# Patient Record
Sex: MALE | Race: WHITE | NOT HISPANIC OR LATINO | Employment: OTHER | URBAN - METROPOLITAN AREA
[De-identification: names, ages, dates, MRNs, and addresses within clinical notes are randomized per-mention and may not be internally consistent; named-entity substitution may affect disease eponyms.]

---

## 2023-03-23 LAB
ANION GAP IN SER/PLAS: 15 MMOL/L (ref 10–20)
CALCIUM (MG/DL) IN SER/PLAS: 9.7 MG/DL (ref 8.6–10.3)
CARBON DIOXIDE, TOTAL (MMOL/L) IN SER/PLAS: 22 MMOL/L (ref 21–32)
CHLORIDE (MMOL/L) IN SER/PLAS: 106 MMOL/L (ref 98–107)
CREATININE (MG/DL) IN SER/PLAS: 2.02 MG/DL (ref 0.5–1.3)
GFR MALE: 32 ML/MIN/1.73M2
GLUCOSE (MG/DL) IN SER/PLAS: 91 MG/DL (ref 74–99)
POTASSIUM (MMOL/L) IN SER/PLAS: 4.6 MMOL/L (ref 3.5–5.3)
SODIUM (MMOL/L) IN SER/PLAS: 138 MMOL/L (ref 136–145)
UREA NITROGEN (MG/DL) IN SER/PLAS: 48 MG/DL (ref 6–23)

## 2023-03-24 DIAGNOSIS — M10.9 GOUT, UNSPECIFIED CAUSE, UNSPECIFIED CHRONICITY, UNSPECIFIED SITE: Primary | ICD-10-CM

## 2023-03-24 RX ORDER — DIAZEPAM 5 MG/1
TABLET ORAL
COMMUNITY
Start: 2022-04-08 | End: 2023-03-31 | Stop reason: ALTCHOICE

## 2023-03-24 RX ORDER — HYDRALAZINE HYDROCHLORIDE 50 MG/1
TABLET, FILM COATED ORAL
COMMUNITY
Start: 2023-03-23 | End: 2023-03-31

## 2023-03-24 RX ORDER — CALCIUM CARBONATE 300MG(750)
1 TABLET,CHEWABLE ORAL DAILY
COMMUNITY
Start: 2023-03-23

## 2023-03-24 RX ORDER — CLINDAMYCIN HYDROCHLORIDE 150 MG/1
CAPSULE ORAL
COMMUNITY
Start: 2022-12-29 | End: 2023-03-30 | Stop reason: ALTCHOICE

## 2023-03-24 RX ORDER — DOXAZOSIN 4 MG/1
1 TABLET ORAL DAILY
COMMUNITY
Start: 2023-01-25 | End: 2023-03-31

## 2023-03-24 RX ORDER — NAPROXEN SODIUM 220 MG/1
1 TABLET, FILM COATED ORAL DAILY
COMMUNITY
Start: 2020-11-18

## 2023-03-24 RX ORDER — AMLODIPINE BESYLATE 5 MG/1
1 TABLET ORAL DAILY
COMMUNITY
Start: 2022-07-01 | End: 2023-05-15

## 2023-03-24 RX ORDER — CHLORTHALIDONE 25 MG/1
1 TABLET ORAL DAILY
COMMUNITY
Start: 2018-07-09 | End: 2023-03-31

## 2023-03-24 RX ORDER — ALLOPURINOL 100 MG/1
TABLET ORAL
COMMUNITY
Start: 2023-03-16 | End: 2023-05-15 | Stop reason: SDUPTHER

## 2023-03-24 RX ORDER — NIFEDIPINE 90 MG/1
1 TABLET, EXTENDED RELEASE ORAL DAILY
COMMUNITY
Start: 2020-12-03 | End: 2023-03-30 | Stop reason: SDUPTHER

## 2023-03-24 RX ORDER — LOSARTAN POTASSIUM 100 MG/1
1 TABLET ORAL DAILY
COMMUNITY
Start: 2018-07-09 | End: 2024-03-21

## 2023-03-24 RX ORDER — COLCHICINE 0.6 MG/1
TABLET ORAL
COMMUNITY
Start: 2016-07-14 | End: 2023-03-24 | Stop reason: SDUPTHER

## 2023-03-24 RX ORDER — COLCHICINE 0.6 MG/1
0.6 TABLET ORAL DAILY
Qty: 30 TABLET | Refills: 3 | Status: SHIPPED | OUTPATIENT
Start: 2023-03-24 | End: 2024-01-31 | Stop reason: WASHOUT

## 2023-03-24 RX ORDER — ETODOLAC 200 MG/1
1 CAPSULE ORAL DAILY
COMMUNITY
Start: 2022-04-26 | End: 2023-03-31

## 2023-03-24 RX ORDER — EZETIMIBE 10 MG/1
1 TABLET ORAL NIGHTLY
COMMUNITY
Start: 2019-09-12 | End: 2023-03-31

## 2023-03-24 RX ORDER — ALLOPURINOL 200 MG/1
1 TABLET ORAL DAILY
COMMUNITY
Start: 2023-01-25 | End: 2023-03-31

## 2023-03-24 RX ORDER — GABAPENTIN 400 MG/1
1 CAPSULE ORAL 2 TIMES DAILY
COMMUNITY
Start: 2022-04-07 | End: 2023-03-31 | Stop reason: ALTCHOICE

## 2023-03-24 RX ORDER — CYCLOBENZAPRINE HCL 5 MG
1 TABLET ORAL 2 TIMES DAILY
COMMUNITY
Start: 2022-03-30 | End: 2023-03-31

## 2023-03-28 DIAGNOSIS — R79.9 ABNORMAL BLOOD CHEMISTRY: ICD-10-CM

## 2023-03-28 DIAGNOSIS — I10 HYPERTENSION, UNSPECIFIED TYPE: ICD-10-CM

## 2023-03-28 DIAGNOSIS — E78.5 HYPERLIPIDEMIA, UNSPECIFIED HYPERLIPIDEMIA TYPE: ICD-10-CM

## 2023-03-28 DIAGNOSIS — M10.9 GOUT, UNSPECIFIED CAUSE, UNSPECIFIED CHRONICITY, UNSPECIFIED SITE: ICD-10-CM

## 2023-03-28 DIAGNOSIS — I25.10 CVD (CARDIOVASCULAR DISEASE): ICD-10-CM

## 2023-03-28 DIAGNOSIS — E03.9 HYPOTHYROIDISM, UNSPECIFIED TYPE: ICD-10-CM

## 2023-03-28 DIAGNOSIS — E55.9 VITAMIN D DEFICIENCY: ICD-10-CM

## 2023-03-29 ENCOUNTER — LAB (OUTPATIENT)
Dept: LAB | Facility: LAB | Age: 83
End: 2023-03-29
Payer: MEDICARE

## 2023-03-29 DIAGNOSIS — R79.9 ABNORMAL BLOOD CHEMISTRY: ICD-10-CM

## 2023-03-29 DIAGNOSIS — M10.9 GOUT, UNSPECIFIED CAUSE, UNSPECIFIED CHRONICITY, UNSPECIFIED SITE: ICD-10-CM

## 2023-03-29 DIAGNOSIS — E03.9 HYPOTHYROIDISM, UNSPECIFIED TYPE: ICD-10-CM

## 2023-03-29 DIAGNOSIS — E78.5 HYPERLIPIDEMIA, UNSPECIFIED HYPERLIPIDEMIA TYPE: ICD-10-CM

## 2023-03-29 DIAGNOSIS — E55.9 VITAMIN D DEFICIENCY: ICD-10-CM

## 2023-03-29 PROBLEM — I73.9 PAD (PERIPHERAL ARTERY DISEASE) (CMS-HCC): Status: ACTIVE | Noted: 2023-03-29

## 2023-03-29 PROBLEM — I25.10 CAD (CORONARY ARTERY DISEASE): Status: ACTIVE | Noted: 2023-03-29

## 2023-03-29 PROBLEM — M54.50 LOW BACK PAIN: Status: ACTIVE | Noted: 2023-03-29

## 2023-03-29 PROBLEM — R43.2 LOSS OF TASTE: Status: ACTIVE | Noted: 2023-03-29

## 2023-03-29 PROBLEM — R22.42 FOOT MASS, LEFT: Status: ACTIVE | Noted: 2023-03-29

## 2023-03-29 PROBLEM — R79.89 LOW TSH LEVEL: Status: ACTIVE | Noted: 2023-03-29

## 2023-03-29 PROBLEM — M54.30 SCIATICA: Status: ACTIVE | Noted: 2023-03-29

## 2023-03-29 PROBLEM — M47.9 OSTEOARTHRITIS OF SPINE: Status: ACTIVE | Noted: 2023-03-29

## 2023-03-29 PROBLEM — M17.12 PRIMARY OSTEOARTHRITIS OF LEFT KNEE: Status: ACTIVE | Noted: 2023-03-29

## 2023-03-29 PROBLEM — I44.1 MOBITZ (TYPE) I (WENCKEBACH'S) ATRIOVENTRICULAR BLOCK: Status: ACTIVE | Noted: 2023-03-29

## 2023-03-29 PROBLEM — R29.898 XIPHOID PROMINENCE: Status: ACTIVE | Noted: 2023-03-29

## 2023-03-29 PROBLEM — D33.4 SCHWANNOMA OF SPINAL CORD (MULTI): Status: ACTIVE | Noted: 2023-03-29

## 2023-03-29 PROBLEM — E78.00 ELEVATED LOW DENSITY LIPOPROTEIN (LDL) CHOLESTEROL LEVEL: Status: ACTIVE | Noted: 2023-03-29

## 2023-03-29 PROBLEM — R00.1 BRADYCARDIA: Status: ACTIVE | Noted: 2023-03-29

## 2023-03-29 PROBLEM — U07.1 COVID-19: Status: ACTIVE | Noted: 2023-03-29

## 2023-03-29 PROBLEM — M79.672 LEFT FOOT PAIN: Status: ACTIVE | Noted: 2023-03-29

## 2023-03-29 PROBLEM — Z95.1 S/P CABG X 5: Status: ACTIVE | Noted: 2023-03-29

## 2023-03-29 PROBLEM — M79.89 SWELLING OF RIGHT LOWER EXTREMITY: Status: ACTIVE | Noted: 2023-03-29

## 2023-03-29 PROBLEM — I65.23 ATHEROSCLEROSIS OF BOTH CAROTID ARTERIES: Status: ACTIVE | Noted: 2023-03-29

## 2023-03-29 PROBLEM — R52 BODY ACHES: Status: ACTIVE | Noted: 2023-03-29

## 2023-03-29 PROBLEM — R22.42 KNEE MASS, LEFT: Status: ACTIVE | Noted: 2023-03-29

## 2023-03-29 PROBLEM — I10 HYPERTENSION: Status: ACTIVE | Noted: 2023-03-29

## 2023-03-29 PROBLEM — N18.9 CHRONIC KIDNEY DISEASE: Status: ACTIVE | Noted: 2023-03-29

## 2023-03-29 PROBLEM — I73.9 INTERMITTENT CLAUDICATION (CMS-HCC): Status: ACTIVE | Noted: 2023-03-29

## 2023-03-29 PROBLEM — M1A.9XX1 CHRONIC TOPHACEOUS GOUT: Status: ACTIVE | Noted: 2023-03-29

## 2023-03-29 PROBLEM — R09.89 BRUIT: Status: ACTIVE | Noted: 2023-03-29

## 2023-03-29 PROBLEM — R73.09 ELEVATED GLUCOSE: Status: ACTIVE | Noted: 2023-03-29

## 2023-03-29 PROBLEM — M89.9 BONE DISORDER: Status: ACTIVE | Noted: 2023-03-29

## 2023-03-29 PROBLEM — M54.16 CHRONIC LUMBAR RADICULOPATHY: Status: ACTIVE | Noted: 2023-03-29

## 2023-03-29 PROBLEM — R07.9 CHEST PAIN: Status: ACTIVE | Noted: 2023-03-29

## 2023-03-29 LAB
ALANINE AMINOTRANSFERASE (SGPT) (U/L) IN SER/PLAS: 18 U/L (ref 10–52)
ANION GAP IN SER/PLAS: 14 MMOL/L (ref 10–20)
CALCIDIOL (25 OH VITAMIN D3) (NG/ML) IN SER/PLAS: 37 NG/ML
CALCIUM (MG/DL) IN SER/PLAS: 9.6 MG/DL (ref 8.6–10.6)
CARBON DIOXIDE, TOTAL (MMOL/L) IN SER/PLAS: 22 MMOL/L (ref 21–32)
CHLORIDE (MMOL/L) IN SER/PLAS: 106 MMOL/L (ref 98–107)
CHOLESTEROL (MG/DL) IN SER/PLAS: 162 MG/DL (ref 0–199)
CHOLESTEROL IN HDL (MG/DL) IN SER/PLAS: 68.6 MG/DL
CHOLESTEROL/HDL RATIO: 2.4
COBALAMIN (VITAMIN B12) (PG/ML) IN SER/PLAS: >2000 PG/ML (ref 211–911)
CREATININE (MG/DL) IN SER/PLAS: 2.27 MG/DL (ref 0.5–1.3)
ERYTHROCYTE DISTRIBUTION WIDTH (RATIO) BY AUTOMATED COUNT: 16.1 % (ref 11.5–14.5)
ERYTHROCYTE MEAN CORPUSCULAR HEMOGLOBIN CONCENTRATION (G/DL) BY AUTOMATED: 32 G/DL (ref 32–36)
ERYTHROCYTE MEAN CORPUSCULAR VOLUME (FL) BY AUTOMATED COUNT: 101 FL (ref 80–100)
ERYTHROCYTES (10*6/UL) IN BLOOD BY AUTOMATED COUNT: 3.18 X10E12/L (ref 4.5–5.9)
GFR MALE: 28 ML/MIN/1.73M2
GLUCOSE (MG/DL) IN SER/PLAS: 116 MG/DL (ref 74–99)
HEMATOCRIT (%) IN BLOOD BY AUTOMATED COUNT: 32.2 % (ref 41–52)
HEMOGLOBIN (G/DL) IN BLOOD: 10.3 G/DL (ref 13.5–17.5)
LDL: 73 MG/DL (ref 0–99)
LEUKOCYTES (10*3/UL) IN BLOOD BY AUTOMATED COUNT: 6.3 X10E9/L (ref 4.4–11.3)
NRBC (PER 100 WBCS) BY AUTOMATED COUNT: 0 /100 WBC (ref 0–0)
PLATELETS (10*3/UL) IN BLOOD AUTOMATED COUNT: 216 X10E9/L (ref 150–450)
POTASSIUM (MMOL/L) IN SER/PLAS: 5 MMOL/L (ref 3.5–5.3)
SODIUM (MMOL/L) IN SER/PLAS: 137 MMOL/L (ref 136–145)
THYROTROPIN (MIU/L) IN SER/PLAS BY DETECTION LIMIT <= 0.05 MIU/L: 1.75 MIU/L (ref 0.44–3.98)
TRIGLYCERIDE (MG/DL) IN SER/PLAS: 103 MG/DL (ref 0–149)
URATE (MG/DL) IN SER/PLAS: 4.9 MG/DL (ref 4–7.5)
UREA NITROGEN (MG/DL) IN SER/PLAS: 56 MG/DL (ref 6–23)
VLDL: 21 MG/DL (ref 0–40)

## 2023-03-29 PROCEDURE — 80061 LIPID PANEL: CPT

## 2023-03-29 PROCEDURE — 36415 COLL VENOUS BLD VENIPUNCTURE: CPT

## 2023-03-29 PROCEDURE — 84550 ASSAY OF BLOOD/URIC ACID: CPT

## 2023-03-29 PROCEDURE — 82306 VITAMIN D 25 HYDROXY: CPT

## 2023-03-29 PROCEDURE — 82607 VITAMIN B-12: CPT

## 2023-03-29 PROCEDURE — 85027 COMPLETE CBC AUTOMATED: CPT

## 2023-03-29 PROCEDURE — 84153 ASSAY OF PSA TOTAL: CPT

## 2023-03-29 PROCEDURE — 84460 ALANINE AMINO (ALT) (SGPT): CPT

## 2023-03-29 PROCEDURE — 84443 ASSAY THYROID STIM HORMONE: CPT

## 2023-03-29 PROCEDURE — 80048 BASIC METABOLIC PNL TOTAL CA: CPT

## 2023-03-30 RX ORDER — MULTIVITAMIN
TABLET ORAL
COMMUNITY
Start: 2006-09-20 | End: 2023-10-04 | Stop reason: ALTCHOICE

## 2023-03-30 RX ORDER — CALCIUM CARBONATE 200(500)MG
TABLET,CHEWABLE ORAL
COMMUNITY
Start: 2019-11-20 | End: 2023-03-31 | Stop reason: ALTCHOICE

## 2023-03-30 RX ORDER — LOSARTAN POTASSIUM 25 MG/1
TABLET ORAL EVERY 24 HOURS
COMMUNITY
Start: 2018-07-09 | End: 2023-03-31

## 2023-03-30 RX ORDER — METOPROLOL SUCCINATE 50 MG/1
TABLET, EXTENDED RELEASE ORAL
COMMUNITY
Start: 2016-05-13 | End: 2023-03-31

## 2023-03-30 RX ORDER — ALLOPURINOL 300 MG/1
TABLET ORAL
COMMUNITY
Start: 2016-06-15 | End: 2023-03-31

## 2023-03-30 RX ORDER — PERPHENAZINE 2 MG
TABLET ORAL EVERY 24 HOURS
COMMUNITY
Start: 2017-04-26 | End: 2023-03-31

## 2023-03-30 RX ORDER — OXYCODONE AND ACETAMINOPHEN 10; 325 MG/1; MG/1
TABLET ORAL
COMMUNITY
Start: 2016-06-06 | End: 2023-03-31 | Stop reason: ALTCHOICE

## 2023-03-30 RX ORDER — CIDER VINEGAR 300 MG
TABLET ORAL EVERY 24 HOURS
COMMUNITY
Start: 2018-12-05 | End: 2023-03-31

## 2023-03-30 RX ORDER — NIFEDIPINE 90 MG/1
TABLET, FILM COATED, EXTENDED RELEASE ORAL
COMMUNITY
Start: 2018-11-23 | End: 2023-03-31

## 2023-03-30 NOTE — ASSESSMENT & PLAN NOTE
Chronic condition documentation: stable based on symptoms and exam. Continue established treatment plan and follow up at least yearly.

## 2023-03-31 ENCOUNTER — OFFICE VISIT (OUTPATIENT)
Dept: PRIMARY CARE | Facility: CLINIC | Age: 83
End: 2023-03-31
Payer: MEDICARE

## 2023-03-31 VITALS
HEART RATE: 59 BPM | WEIGHT: 145 LBS | OXYGEN SATURATION: 98 % | TEMPERATURE: 98 F | BODY MASS INDEX: 22.76 KG/M2 | HEIGHT: 67 IN | SYSTOLIC BLOOD PRESSURE: 130 MMHG | DIASTOLIC BLOOD PRESSURE: 80 MMHG

## 2023-03-31 DIAGNOSIS — I10 HYPERTENSION, UNSPECIFIED TYPE: Primary | ICD-10-CM

## 2023-03-31 LAB — PROSTATE SPECIFIC AG (NG/ML) IN SER/PLAS: 4.06 NG/ML (ref 0–4)

## 2023-03-31 PROCEDURE — 1159F MED LIST DOCD IN RCRD: CPT | Performed by: INTERNAL MEDICINE

## 2023-03-31 PROCEDURE — 99214 OFFICE O/P EST MOD 30 MIN: CPT | Performed by: INTERNAL MEDICINE

## 2023-03-31 PROCEDURE — 1036F TOBACCO NON-USER: CPT | Performed by: INTERNAL MEDICINE

## 2023-03-31 PROCEDURE — 3075F SYST BP GE 130 - 139MM HG: CPT | Performed by: INTERNAL MEDICINE

## 2023-03-31 PROCEDURE — 3079F DIAST BP 80-89 MM HG: CPT | Performed by: INTERNAL MEDICINE

## 2023-03-31 RX ORDER — CLONIDINE HYDROCHLORIDE 0.1 MG/1
0.1 TABLET ORAL 2 TIMES DAILY
Qty: 60 TABLET | Refills: 11 | Status: SHIPPED | OUTPATIENT
Start: 2023-03-31 | End: 2023-04-10 | Stop reason: SINTOL

## 2023-03-31 NOTE — PROGRESS NOTES
"Subjective   Patient ID: Martín Capellan is a 82 y.o. male who presents for Follow-up.  Patient comes in for a physical examination, doing well over - all with no particular complaints.   Also in for laboratory review and health maintenance update.  Updating family history as well.         Review of Systems    Objective   Physical Exam  /80 (BP Location: Right arm)   Pulse 59   Temp 36.7 °C (98 °F)   Ht 1.702 m (5' 7\")   Wt 65.8 kg (145 lb)   SpO2 98%   BMI 22.71 kg/m²         Assessment/Plan   Problem List Items Addressed This Visit          Circulatory    Hypertension - Primary    Relevant Medications    cloNIDine (Catapres) 0.1 mg tablet     In for Blood Pressure check doing well overall. No other current issues. Adding Clonidien follow up in one week.     "

## 2023-04-10 ENCOUNTER — OFFICE VISIT (OUTPATIENT)
Dept: PRIMARY CARE | Facility: CLINIC | Age: 83
End: 2023-04-10
Payer: MEDICARE

## 2023-04-10 VITALS
WEIGHT: 143 LBS | RESPIRATION RATE: 18 BRPM | BODY MASS INDEX: 22.4 KG/M2 | DIASTOLIC BLOOD PRESSURE: 80 MMHG | TEMPERATURE: 97.8 F | SYSTOLIC BLOOD PRESSURE: 160 MMHG

## 2023-04-10 DIAGNOSIS — I10 HYPERTENSION, UNSPECIFIED TYPE: Primary | ICD-10-CM

## 2023-04-10 PROCEDURE — 3077F SYST BP >= 140 MM HG: CPT | Performed by: INTERNAL MEDICINE

## 2023-04-10 PROCEDURE — 3079F DIAST BP 80-89 MM HG: CPT | Performed by: INTERNAL MEDICINE

## 2023-04-10 PROCEDURE — 1036F TOBACCO NON-USER: CPT | Performed by: INTERNAL MEDICINE

## 2023-04-10 PROCEDURE — 99213 OFFICE O/P EST LOW 20 MIN: CPT | Performed by: INTERNAL MEDICINE

## 2023-04-10 PROCEDURE — 1159F MED LIST DOCD IN RCRD: CPT | Performed by: INTERNAL MEDICINE

## 2023-04-10 ASSESSMENT — ENCOUNTER SYMPTOMS
DEPRESSION: 0
OCCASIONAL FEELINGS OF UNSTEADINESS: 0
LOSS OF SENSATION IN FEET: 0

## 2023-04-10 NOTE — PROGRESS NOTES
Subjective   Patient ID: Martín Capellan is a 82 y.o. male who presents for Med Refill.  HPI  In for follow up for Myalgias. He has quit drinking and has been working out.  Review of Systems    Objective   Physical Exam  /80 (BP Location: Left arm, Patient Position: Sitting, BP Cuff Size: Adult)   Temp 36.6 °C (97.8 °F)   Resp 18   Wt 64.9 kg (143 lb)   BMI 22.40 kg/m²         Assessment/Plan   Problem List Items Addressed This Visit          Circulatory    Hypertension - Primary     Still not under good control      Will follow. I have urged him to continue walking and cease ETOH.

## 2023-05-12 RX ORDER — EZETIMIBE 10 MG/1
TABLET ORAL
COMMUNITY
Start: 2023-04-05 | End: 2024-03-21

## 2023-05-12 RX ORDER — DOXAZOSIN 4 MG/1
TABLET ORAL
COMMUNITY
Start: 2023-04-26 | End: 2023-05-15 | Stop reason: ALTCHOICE

## 2023-05-15 ENCOUNTER — OFFICE VISIT (OUTPATIENT)
Dept: PRIMARY CARE | Facility: CLINIC | Age: 83
End: 2023-05-15
Payer: MEDICARE

## 2023-05-15 VITALS
RESPIRATION RATE: 16 BRPM | WEIGHT: 140 LBS | TEMPERATURE: 97.8 F | OXYGEN SATURATION: 99 % | HEART RATE: 66 BPM | SYSTOLIC BLOOD PRESSURE: 136 MMHG | DIASTOLIC BLOOD PRESSURE: 72 MMHG | BODY MASS INDEX: 21.97 KG/M2 | HEIGHT: 67 IN

## 2023-05-15 DIAGNOSIS — I10 HYPERTENSION, UNSPECIFIED TYPE: Primary | ICD-10-CM

## 2023-05-15 DIAGNOSIS — M10.9 GOUT, UNSPECIFIED CAUSE, UNSPECIFIED CHRONICITY, UNSPECIFIED SITE: ICD-10-CM

## 2023-05-15 PROCEDURE — 99213 OFFICE O/P EST LOW 20 MIN: CPT | Performed by: INTERNAL MEDICINE

## 2023-05-15 PROCEDURE — 3078F DIAST BP <80 MM HG: CPT | Performed by: INTERNAL MEDICINE

## 2023-05-15 PROCEDURE — 3075F SYST BP GE 130 - 139MM HG: CPT | Performed by: INTERNAL MEDICINE

## 2023-05-15 PROCEDURE — 1159F MED LIST DOCD IN RCRD: CPT | Performed by: INTERNAL MEDICINE

## 2023-05-15 PROCEDURE — 1036F TOBACCO NON-USER: CPT | Performed by: INTERNAL MEDICINE

## 2023-05-15 RX ORDER — ALLOPURINOL 100 MG/1
200 TABLET ORAL DAILY
Qty: 90 TABLET | Refills: 3 | Status: SHIPPED | OUTPATIENT
Start: 2023-05-15 | End: 2023-10-04 | Stop reason: ALTCHOICE

## 2023-05-15 ASSESSMENT — PAIN SCALES - GENERAL: PAINLEVEL: 0-NO PAIN

## 2023-05-15 ASSESSMENT — ANXIETY QUESTIONNAIRES
6. BECOMING EASILY ANNOYED OR IRRITABLE: NOT AT ALL
7. FEELING AFRAID AS IF SOMETHING AWFUL MIGHT HAPPEN: NOT AT ALL
5. BEING SO RESTLESS THAT IT IS HARD TO SIT STILL: NOT AT ALL
1. FEELING NERVOUS, ANXIOUS, OR ON EDGE: NOT AT ALL
GAD7 TOTAL SCORE: 0
4. TROUBLE RELAXING: NOT AT ALL
2. NOT BEING ABLE TO STOP OR CONTROL WORRYING: NOT AT ALL
3. WORRYING TOO MUCH ABOUT DIFFERENT THINGS: NOT AT ALL

## 2023-05-15 ASSESSMENT — PATIENT HEALTH QUESTIONNAIRE - PHQ9
SUM OF ALL RESPONSES TO PHQ9 QUESTIONS 1 AND 2: 0
2. FEELING DOWN, DEPRESSED OR HOPELESS: NOT AT ALL
SUM OF ALL RESPONSES TO PHQ9 QUESTIONS 1 AND 2: 0
2. FEELING DOWN, DEPRESSED OR HOPELESS: NOT AT ALL
1. LITTLE INTEREST OR PLEASURE IN DOING THINGS: NOT AT ALL
1. LITTLE INTEREST OR PLEASURE IN DOING THINGS: NOT AT ALL

## 2023-05-15 ASSESSMENT — ENCOUNTER SYMPTOMS
LOSS OF SENSATION IN FEET: 0
DEPRESSION: 0
OCCASIONAL FEELINGS OF UNSTEADINESS: 0

## 2023-05-15 NOTE — PROGRESS NOTES
"Subjective   Patient ID: Martín Capellan is a 82 y.o. male who presents for Follow-up and BP concerns.  In for Blood Pressure check doing well overall. No other current issues.          Review of Systems   All other systems reviewed and are negative.      Objective   Physical Exam  Constitutional:       Appearance: Normal appearance.   HENT:      Head: Normocephalic and atraumatic.      Nose: Nose normal.   Cardiovascular:      Rate and Rhythm: Normal rate and regular rhythm.   Abdominal:      General: Abdomen is flat.      Palpations: Abdomen is soft.   Musculoskeletal:         General: Normal range of motion.      Cervical back: Normal range of motion.   Skin:     General: Skin is warm and dry.   Neurological:      Mental Status: He is alert.       /72 (BP Location: Left arm)   Pulse 66   Temp 36.6 °C (97.8 °F)   Resp 16   Ht 1.702 m (5' 7\")   Wt 63.5 kg (140 lb)   SpO2 99%   BMI 21.93 kg/m²         Assessment/Plan   Problem List Items Addressed This Visit          Circulatory    Hypertension - Primary       Other    Gout    Relevant Medications    allopurinol (Zyloprim) 100 mg tablet   In for Blood Pressure check doing well overall. No other current issues.       "

## 2023-05-31 ENCOUNTER — TELEPHONE (OUTPATIENT)
Dept: PRIMARY CARE | Facility: CLINIC | Age: 83
End: 2023-05-31
Payer: MEDICARE

## 2023-05-31 NOTE — TELEPHONE ENCOUNTER
Patient called d would really like you to call him? Another gout flare wants to talk to you please?

## 2023-07-26 ENCOUNTER — TELEPHONE (OUTPATIENT)
Dept: PRIMARY CARE | Facility: CLINIC | Age: 83
End: 2023-07-26
Payer: MEDICARE

## 2023-07-26 NOTE — TELEPHONE ENCOUNTER
Patient called in stating he would like Soham to call him, its related to his gout has some questions and concerns    # - [phone] **

## 2023-08-14 LAB
ALBUMIN (G/DL) IN SER/PLAS: 4.2 G/DL (ref 3.4–5)
ALBUMIN (MG/L) IN URINE: 540.3 MG/L
ALBUMIN/CREATININE (UG/MG) IN URINE: 931.6 UG/MG CRT (ref 0–30)
ANION GAP IN SER/PLAS: 15 MMOL/L (ref 10–20)
APPEARANCE, URINE: CLEAR
BILIRUBIN, URINE: NEGATIVE
BLOOD, URINE: NEGATIVE
CALCIDIOL (25 OH VITAMIN D3) (NG/ML) IN SER/PLAS: 36 NG/ML
CALCIUM (MG/DL) IN SER/PLAS: 9.7 MG/DL (ref 8.6–10.6)
CARBON DIOXIDE, TOTAL (MMOL/L) IN SER/PLAS: 23 MMOL/L (ref 21–32)
CHLORIDE (MMOL/L) IN SER/PLAS: 104 MMOL/L (ref 98–107)
COLOR, URINE: ABNORMAL
CREATININE (MG/DL) IN SER/PLAS: 2 MG/DL (ref 0.5–1.3)
CREATININE (MG/DL) IN URINE: 58 MG/DL (ref 20–370)
FERRITIN (UG/LL) IN SER/PLAS: 393 UG/L (ref 20–300)
GFR MALE: 33 ML/MIN/1.73M2
GLUCOSE (MG/DL) IN SER/PLAS: 100 MG/DL (ref 74–99)
GLUCOSE, URINE: NEGATIVE MG/DL
IRON (UG/DL) IN SER/PLAS: 144 UG/DL (ref 35–150)
IRON BINDING CAPACITY (UG/DL) IN SER/PLAS: 335 UG/DL (ref 240–445)
IRON SATURATION (%) IN SER/PLAS: 43 % (ref 25–45)
KETONES, URINE: NEGATIVE MG/DL
LEUKOCYTE ESTERASE, URINE: NEGATIVE
MUCUS, URINE: NORMAL /LPF
NITRITE, URINE: NEGATIVE
PARATHYRIN INTACT (PG/ML) IN SER/PLAS: 123.2 PG/ML (ref 18.5–88)
PH, URINE: 6 (ref 5–8)
PHOSPHATE (MG/DL) IN SER/PLAS: 4.2 MG/DL (ref 2.5–4.9)
POTASSIUM (MMOL/L) IN SER/PLAS: 5 MMOL/L (ref 3.5–5.3)
PROTEIN, URINE: ABNORMAL MG/DL
RBC, URINE: NORMAL /HPF (ref 0–5)
SODIUM (MMOL/L) IN SER/PLAS: 137 MMOL/L (ref 136–145)
SPECIFIC GRAVITY, URINE: 1.01 (ref 1–1.03)
UREA NITROGEN (MG/DL) IN SER/PLAS: 36 MG/DL (ref 6–23)
UROBILINOGEN, URINE: <2 MG/DL (ref 0–1.9)
WBC, URINE: NORMAL /HPF (ref 0–5)

## 2023-08-27 PROBLEM — M77.42 METATARSALGIA OF LEFT FOOT: Status: ACTIVE | Noted: 2023-08-27

## 2023-08-27 PROBLEM — M79.605 BILATERAL LEG PAIN: Status: ACTIVE | Noted: 2023-08-27

## 2023-08-27 PROBLEM — M79.604 BILATERAL LEG PAIN: Status: ACTIVE | Noted: 2023-08-27

## 2023-08-27 PROBLEM — I65.29 CAROTID STENOSIS, ASYMPTOMATIC: Status: ACTIVE | Noted: 2023-08-27

## 2023-08-27 RX ORDER — HYDRALAZINE HYDROCHLORIDE 50 MG/1
TABLET, FILM COATED ORAL
COMMUNITY
Start: 2023-05-30 | End: 2023-10-04 | Stop reason: ALTCHOICE

## 2023-10-04 ENCOUNTER — OFFICE VISIT (OUTPATIENT)
Dept: CARDIOLOGY | Facility: HOSPITAL | Age: 83
End: 2023-10-04
Payer: MEDICARE

## 2023-10-04 VITALS
HEIGHT: 67 IN | SYSTOLIC BLOOD PRESSURE: 140 MMHG | DIASTOLIC BLOOD PRESSURE: 78 MMHG | BODY MASS INDEX: 21.97 KG/M2 | HEART RATE: 53 BPM | WEIGHT: 140 LBS

## 2023-10-04 DIAGNOSIS — I25.10 CORONARY ARTERY DISEASE INVOLVING NATIVE CORONARY ARTERY OF NATIVE HEART WITHOUT ANGINA PECTORIS: ICD-10-CM

## 2023-10-04 DIAGNOSIS — I65.23 OCCLUSION AND STENOSIS OF BILATERAL CAROTID ARTERIES: ICD-10-CM

## 2023-10-04 DIAGNOSIS — I10 HYPERTENSION, UNSPECIFIED TYPE: Primary | ICD-10-CM

## 2023-10-04 DIAGNOSIS — I65.29 ASYMPTOMATIC CAROTID ARTERY STENOSIS, UNSPECIFIED LATERALITY: ICD-10-CM

## 2023-10-04 DIAGNOSIS — E78.00 PURE HYPERCHOLESTEROLEMIA: ICD-10-CM

## 2023-10-04 DIAGNOSIS — Z95.1 S/P CABG X 5: ICD-10-CM

## 2023-10-04 PROCEDURE — 99214 OFFICE O/P EST MOD 30 MIN: CPT | Performed by: INTERNAL MEDICINE

## 2023-10-04 PROCEDURE — 3078F DIAST BP <80 MM HG: CPT | Performed by: INTERNAL MEDICINE

## 2023-10-04 PROCEDURE — 3077F SYST BP >= 140 MM HG: CPT | Performed by: INTERNAL MEDICINE

## 2023-10-04 PROCEDURE — 1160F RVW MEDS BY RX/DR IN RCRD: CPT | Performed by: INTERNAL MEDICINE

## 2023-10-04 PROCEDURE — 1126F AMNT PAIN NOTED NONE PRSNT: CPT | Performed by: INTERNAL MEDICINE

## 2023-10-04 PROCEDURE — 1159F MED LIST DOCD IN RCRD: CPT | Performed by: INTERNAL MEDICINE

## 2023-10-04 PROCEDURE — 93005 ELECTROCARDIOGRAM TRACING: CPT | Performed by: INTERNAL MEDICINE

## 2023-10-04 PROCEDURE — 1036F TOBACCO NON-USER: CPT | Performed by: INTERNAL MEDICINE

## 2023-10-04 RX ORDER — ASCORBIC ACID 1000 MG
175 TABLET ORAL DAILY
COMMUNITY

## 2023-10-04 RX ORDER — DOXAZOSIN 2 MG/1
2 TABLET ORAL ONCE
Status: DISCONTINUED | OUTPATIENT
Start: 2023-10-04 | End: 2023-10-24

## 2023-10-04 ASSESSMENT — ENCOUNTER SYMPTOMS
DEPRESSION: 0
LOSS OF SENSATION IN FEET: 0
OCCASIONAL FEELINGS OF UNSTEADINESS: 0

## 2023-10-04 ASSESSMENT — PATIENT HEALTH QUESTIONNAIRE - PHQ9
SUM OF ALL RESPONSES TO PHQ9 QUESTIONS 1 AND 2: 0
1. LITTLE INTEREST OR PLEASURE IN DOING THINGS: NOT AT ALL
2. FEELING DOWN, DEPRESSED OR HOPELESS: NOT AT ALL

## 2023-10-04 NOTE — PATIENT INSTRUCTIONS
Start doxazosin 2mg daily.  Monitor your blood pressure at home and call the office with your readings in one month.  Follow up in 6 months with a carotid ultrasound.

## 2023-10-04 NOTE — PROGRESS NOTES
"Chief Complaint:   Coronary Artery Disease, Hyperlipidemia, Hypertension, and S/P CABGX5     History Of Present Illness:    Martín Capellan is a 83 y.o. male presents for follow-up.  He has no cardiac complaints.  He is generally active and walks without chest pain or shortness of breath.  He monitors his blood pressure at home and systolic blood pressure usually in the 130s and 140s.  The patient denies chest pain, shortness of breath, palpitations, lightheadedness, syncope, orthopnea, paroxysmal nocturnal dyspnea, lower extremity edema, or bleeding problems.     Last Recorded Vitals:  Vitals:    10/04/23 1555   BP: 140/78   BP Location: Left arm   Patient Position: Sitting   BP Cuff Size: Adult   Pulse: 53   Weight: 63.5 kg (140 lb)   Height: 1.702 m (5' 7\")       Past Medical History:  He has no past medical history on file.    Past Surgical History:  He has a past surgical history that includes Coronary artery bypass graft (04/26/2022).      Social History:  He reports that he has quit smoking. His smoking use included cigarettes. He has never used smokeless tobacco. He reports current alcohol use of about 2.0 standard drinks of alcohol per week. He reports that he does not currently use drugs.    Family History:  Family History   Problem Relation Name Age of Onset    No Known Problems Mother      No Known Problems Father          Allergies:  Other    Outpatient Medications:  Current Outpatient Medications   Medication Instructions    aspirin 81 mg chewable tablet 1 tablet, oral, Daily    colchicine (gout) 0.6 mg, oral, Daily    cyanocobalamin (VITAMIN B-12) 50 mcg, oral, Daily    ezetimibe (Zetia) 10 mg tablet     losartan (Cozaar) 100 mg tablet 1 tablet, oral, Daily    magnesium oxide (Mag-Ox) 400 mg tablet 1 tablet, oral, Daily    milk thistle 175 mg, oral, Daily       Physical Exam:  General: Well-developed well-nourished in no acute distress  HEENT: Normocephalic atraumatic  Neck: Supple, JVP is normal " negative hepatojugular reflux   Pulmonary: Normal respiratory effort, clear to auscultation  Cardiovascular: No right ventricular heave, normal S1 and S2, no murmurs rubs or gallops  Abdomen: Soft nontender nondistended  Extremities: Warm without edema 2+ radial pulses bilaterally Neurologic: Alert and oriented x3  Psychiatric: Normal mood and affect     Last Labs:  CBC -  Lab Results   Component Value Date    WBC 6.3 03/29/2023    HGB 10.3 (L) 03/29/2023    HCT 32.2 (L) 03/29/2023     (H) 03/29/2023     03/29/2023       CMP -  Lab Results   Component Value Date    CALCIUM 9.7 08/14/2023    PHOS 4.2 08/14/2023    PROT 6.9 05/21/2020    ALBUMIN 4.2 08/14/2023    AST 23 05/21/2020    ALT 18 03/29/2023    ALKPHOS 56 05/21/2020    BILITOT 0.5 05/21/2020       LIPID PANEL -   Lab Results   Component Value Date    CHOL 162 03/29/2023    TRIG 103 03/29/2023    HDL 68.6 03/29/2023    CHHDL 2.4 03/29/2023    LDLF 73 03/29/2023    VLDL 21 03/29/2023       RENAL FUNCTION PANEL -   Lab Results   Component Value Date    GLUCOSE 100 (H) 08/14/2023     08/14/2023    K 5.0 08/14/2023     08/14/2023    CO2 23 08/14/2023    ANIONGAP 15 08/14/2023    BUN 36 (H) 08/14/2023    CREATININE 2.00 (H) 08/14/2023    GFRMALE 33 (A) 08/14/2023    CALCIUM 9.7 08/14/2023    PHOS 4.2 08/14/2023    ALBUMIN 4.2 08/14/2023        Lab Results   Component Value Date    HGBA1C 5.8 02/03/2021       Last Cardiology Tests:  ECG:  I reviewed electrocardiogram from March 23, 2023 that showed sinus bradycardia with first-degree AV block possible septal infarct pattern.    Cardiac Imaging:    Renal arter US 5/26/23  CONCLUSIONS:  Renal Artery Duplex: RAR may be inaccurate due to elevated aortic velocities.  Right Renal Artery: Right renal arteries demonstrate no evidence of hemodynamically significant stenosis. The right renal artery demonstrates a high resistive flow pattern. The right renal vein is patent with pulsatile flow.  Left  Renal Artery: Left renal arteries demonstrate no evidence of hemodynamically significant stenosis. The left renal artery demonstrates a high resistive flow pattern. The left renal vein is patent with pulsatile flow. Elevated velocities in proximal segment with normal RAR.      Carotid US 3/23/23  CONCLUSIONS:     Right Carotid: Findings are consistent with greater than 70% stenosis of the right proximal ICA. Turbulent flow seen by color Doppler. There is a >50% stenosis noted in the right external carotid artery. No evidence of hemodynamically significant stenosis of the right common carotid artery. Abnormal pre steal waveforms are visualized in the right vertebral artery suggestive of proximal stenosis. There is a >50% stenosis noted in the right subclavian artery.     Left Carotid: Findings are consistent with 50 to 69% stenosis of the left proximal ICA. Turbulent flow seen by color Doppler. Unable to obtain a doppler signal in the left ECA that is suggestive of occlusion. No evidence of hemodynamically significant stenosis of the left common carotid artery. The left vertebral artery is patent with antegrade flow. No evidence of hemodynamically significant stenosis in the left subclavian artery.        Assessment/Plan   Diagnoses and all orders for this visit:  Hypertension, unspecified type  -     ECG 12 lead (Clinic Performed)  -     doxazosin (Cardura) tablet 2 mg  Coronary artery disease involving native coronary artery of native heart without angina pectoris  -     Vascular US carotid artery duplex bilateral; Future  S/P CABG x 5  Pure hypercholesterolemia  Asymptomatic carotid artery stenosis, unspecified laterality  Occlusion and stenosis of bilateral carotid arteries  -     Vascular US carotid artery duplex bilateral; Future    In summary Mr. Capellan is a pleasant 83 year-old male with a past medical history significant for coronary artery disease status post CABG with preserved LV function, hypertension,  hyperlipidemia with intolerance to multiple statins and Repatha, moderate bilateral carotid artery disease, peripheral arterial disease with abnormal MEY on the left, asymptomatic Mobitz one second degree AV block and chronic kidney disease.  He is asymptomatic from a cardiac perspective.  His blood pressure is still not at goal.  He was intolerant to amlodipine and nifedipine secondary to lower extremity edema.  He did not tolerate hydralazine or chlorthalidone.  He was agreeable to retrying doxazosin 2 mg daily.  He does not wish to take any additional medications for his cholesterol.  He should continue his current cardiovascular medications.  He will monitor his blood pressure at home and call my office with his results in 1 month.  We will see him back in follow-up in 6 months with a carotid ultrasound.    John Jimenez MD

## 2023-10-12 ENCOUNTER — TELEPHONE (OUTPATIENT)
Dept: CARDIOLOGY | Facility: HOSPITAL | Age: 83
End: 2023-10-12
Payer: MEDICARE

## 2023-10-12 NOTE — TELEPHONE ENCOUNTER
LM:    Doxazosin 2mg prescribed last visit was sent to [facility].   Next appointment is 6.25.24 at 0900 with Dr. Jimenez

## 2023-10-24 DIAGNOSIS — I10 PRIMARY HYPERTENSION: Primary | ICD-10-CM

## 2023-10-24 RX ORDER — DOXAZOSIN 2 MG/1
2 TABLET ORAL NIGHTLY
Qty: 30 TABLET | Refills: 11 | Status: SHIPPED | OUTPATIENT
Start: 2023-10-24 | End: 2024-10-23

## 2023-12-13 ENCOUNTER — LAB (OUTPATIENT)
Dept: LAB | Facility: LAB | Age: 83
End: 2023-12-13
Payer: MEDICARE

## 2023-12-13 DIAGNOSIS — I10 ESSENTIAL (PRIMARY) HYPERTENSION: ICD-10-CM

## 2023-12-13 DIAGNOSIS — N18.30 CHRONIC KIDNEY DISEASE, STAGE 3 UNSPECIFIED (MULTI): Primary | ICD-10-CM

## 2023-12-13 DIAGNOSIS — R80.0 ISOLATED PROTEINURIA: ICD-10-CM

## 2023-12-13 DIAGNOSIS — E78.49 OTHER HYPERLIPIDEMIA: ICD-10-CM

## 2023-12-13 LAB
ALBUMIN SERPL BCP-MCNC: 4.2 G/DL (ref 3.4–5)
ANION GAP SERPL CALC-SCNC: 14 MMOL/L (ref 10–20)
BACTERIA #/AREA URNS AUTO: ABNORMAL /HPF
BUN SERPL-MCNC: 52 MG/DL (ref 6–23)
CALCIUM SERPL-MCNC: 9.4 MG/DL (ref 8.6–10.6)
CHLORIDE SERPL-SCNC: 104 MMOL/L (ref 98–107)
CO2 SERPL-SCNC: 23 MMOL/L (ref 21–32)
CREAT SERPL-MCNC: 1.97 MG/DL (ref 0.5–1.3)
CREAT UR-MCNC: 83.3 MG/DL (ref 20–370)
CREAT UR-MCNC: 83.4 MG/DL (ref 20–370)
ERYTHROCYTE [DISTWIDTH] IN BLOOD BY AUTOMATED COUNT: 14.1 % (ref 11.5–14.5)
GFR SERPL CREATININE-BSD FRML MDRD: 33 ML/MIN/1.73M*2
GLUCOSE SERPL-MCNC: 93 MG/DL (ref 74–99)
HCT VFR BLD AUTO: 28.3 % (ref 41–52)
HGB BLD-MCNC: 9.1 G/DL (ref 13.5–17.5)
MCH RBC QN AUTO: 33.3 PG (ref 26–34)
MCHC RBC AUTO-ENTMCNC: 32.2 G/DL (ref 32–36)
MCV RBC AUTO: 104 FL (ref 80–100)
MICROALBUMIN UR-MCNC: 380.1 MG/L
MICROALBUMIN/CREAT UR: 456.3 UG/MG CREAT
NRBC BLD-RTO: 0 /100 WBCS (ref 0–0)
PHOSPHATE SERPL-MCNC: 3.9 MG/DL (ref 2.5–4.9)
PLATELET # BLD AUTO: 255 X10*3/UL (ref 150–450)
POTASSIUM SERPL-SCNC: 5.3 MMOL/L (ref 3.5–5.3)
PROT UR-ACNC: 54 MG/DL (ref 5–25)
PROT/CREAT UR: 0.65 MG/MG CREAT (ref 0–0.17)
RBC # BLD AUTO: 2.73 X10*6/UL (ref 4.5–5.9)
RBC #/AREA URNS AUTO: ABNORMAL /HPF
SODIUM SERPL-SCNC: 136 MMOL/L (ref 136–145)
WBC # BLD AUTO: 7.2 X10*3/UL (ref 4.4–11.3)
WBC #/AREA URNS AUTO: ABNORMAL /HPF

## 2023-12-13 PROCEDURE — 80069 RENAL FUNCTION PANEL: CPT

## 2023-12-13 PROCEDURE — 84156 ASSAY OF PROTEIN URINE: CPT

## 2023-12-13 PROCEDURE — 85027 COMPLETE CBC AUTOMATED: CPT

## 2023-12-13 PROCEDURE — 81001 URINALYSIS AUTO W/SCOPE: CPT

## 2023-12-13 PROCEDURE — 82570 ASSAY OF URINE CREATININE: CPT

## 2023-12-13 PROCEDURE — 36415 COLL VENOUS BLD VENIPUNCTURE: CPT

## 2023-12-13 PROCEDURE — 82043 UR ALBUMIN QUANTITATIVE: CPT

## 2024-01-04 ENCOUNTER — OFFICE VISIT (OUTPATIENT)
Dept: PRIMARY CARE | Facility: CLINIC | Age: 84
End: 2024-01-04
Payer: MEDICARE

## 2024-01-04 VITALS
HEIGHT: 67 IN | SYSTOLIC BLOOD PRESSURE: 160 MMHG | RESPIRATION RATE: 16 BRPM | WEIGHT: 141 LBS | HEART RATE: 68 BPM | BODY MASS INDEX: 22.13 KG/M2 | TEMPERATURE: 97.8 F | OXYGEN SATURATION: 98 % | DIASTOLIC BLOOD PRESSURE: 80 MMHG

## 2024-01-04 DIAGNOSIS — R21 RASH: Primary | ICD-10-CM

## 2024-01-04 PROCEDURE — 99213 OFFICE O/P EST LOW 20 MIN: CPT | Performed by: INTERNAL MEDICINE

## 2024-01-04 PROCEDURE — 1126F AMNT PAIN NOTED NONE PRSNT: CPT | Performed by: INTERNAL MEDICINE

## 2024-01-04 PROCEDURE — 1036F TOBACCO NON-USER: CPT | Performed by: INTERNAL MEDICINE

## 2024-01-04 PROCEDURE — 3079F DIAST BP 80-89 MM HG: CPT | Performed by: INTERNAL MEDICINE

## 2024-01-04 PROCEDURE — 1159F MED LIST DOCD IN RCRD: CPT | Performed by: INTERNAL MEDICINE

## 2024-01-04 PROCEDURE — 3077F SYST BP >= 140 MM HG: CPT | Performed by: INTERNAL MEDICINE

## 2024-01-04 RX ORDER — METHYLPREDNISOLONE 4 MG/1
TABLET ORAL
Qty: 21 TABLET | Refills: 0 | Status: SHIPPED | OUTPATIENT
Start: 2024-01-04 | End: 2024-01-11

## 2024-01-04 NOTE — PROGRESS NOTES
"Subjective   Patient ID: Martín Capellan is a 83 y.o. male who presents for Rash.  In for follow up rash. Basically on both extremities.    Rash        Review of Systems   Skin:  Positive for rash.   All other systems reviewed and are negative.      Objective   Physical Exam  Constitutional:       Appearance: Normal appearance.   HENT:      Head: Normocephalic and atraumatic.      Nose: Nose normal.   Cardiovascular:      Rate and Rhythm: Normal rate and regular rhythm.   Abdominal:      General: Abdomen is flat.      Palpations: Abdomen is soft.   Musculoskeletal:         General: Normal range of motion.      Cervical back: Normal range of motion.   Skin:     General: Skin is warm and dry.   Neurological:      Mental Status: He is alert.       /80   Pulse 68   Temp 36.6 °C (97.8 °F)   Resp 16   Ht 1.702 m (5' 7\")   Wt 64 kg (141 lb)   SpO2 98%   BMI 22.08 kg/m²         Assessment/Plan   Problem List Items Addressed This Visit    None  Visit Diagnoses       Rash    -  Primary    Relevant Medications    methylPREDNISolone (Medrol Dospak) 4 mg tablets          Rash... Will follow, Call if there are any issues,      "

## 2024-01-08 PROBLEM — R21 RASH: Status: ACTIVE | Noted: 2024-01-08

## 2024-02-01 ENCOUNTER — OFFICE VISIT (OUTPATIENT)
Dept: PRIMARY CARE | Facility: CLINIC | Age: 84
End: 2024-02-01
Payer: MEDICARE

## 2024-02-01 VITALS
WEIGHT: 141 LBS | SYSTOLIC BLOOD PRESSURE: 142 MMHG | OXYGEN SATURATION: 98 % | RESPIRATION RATE: 16 BRPM | DIASTOLIC BLOOD PRESSURE: 90 MMHG | HEART RATE: 58 BPM | BODY MASS INDEX: 22.13 KG/M2 | HEIGHT: 67 IN | TEMPERATURE: 98 F

## 2024-02-01 DIAGNOSIS — R05.9 COUGH, UNSPECIFIED TYPE: Primary | ICD-10-CM

## 2024-02-01 PROCEDURE — 1159F MED LIST DOCD IN RCRD: CPT | Performed by: INTERNAL MEDICINE

## 2024-02-01 PROCEDURE — 3080F DIAST BP >= 90 MM HG: CPT | Performed by: INTERNAL MEDICINE

## 2024-02-01 PROCEDURE — 1124F ACP DISCUSS-NO DSCNMKR DOCD: CPT | Performed by: INTERNAL MEDICINE

## 2024-02-01 PROCEDURE — 3077F SYST BP >= 140 MM HG: CPT | Performed by: INTERNAL MEDICINE

## 2024-02-01 PROCEDURE — 99213 OFFICE O/P EST LOW 20 MIN: CPT | Performed by: INTERNAL MEDICINE

## 2024-02-01 PROCEDURE — 1126F AMNT PAIN NOTED NONE PRSNT: CPT | Performed by: INTERNAL MEDICINE

## 2024-02-01 PROCEDURE — 1036F TOBACCO NON-USER: CPT | Performed by: INTERNAL MEDICINE

## 2024-02-01 RX ORDER — DOXYCYCLINE 100 MG/1
100 CAPSULE ORAL 2 TIMES DAILY
Qty: 28 CAPSULE | Refills: 0 | Status: SHIPPED | OUTPATIENT
Start: 2024-02-01 | End: 2024-02-15

## 2024-02-01 RX ORDER — CLONIDINE HYDROCHLORIDE 0.1 MG/1
0.1 TABLET ORAL EVERY 12 HOURS
COMMUNITY
Start: 2023-03-31 | End: 2024-03-30

## 2024-02-01 ASSESSMENT — ENCOUNTER SYMPTOMS: COUGH: 1

## 2024-02-01 NOTE — PROGRESS NOTES
"Subjective   Patient ID: Martín Capellan is a 83 y.o. male who presents for Cough and Nasal Congestion.  The onset of the cough he has been gradual/side and he has been occurring in an intermittent/persistent pattern for 2 weeks.,, The course has been worsening with scant sputum daily. The symptoms are associated with runny nose and nasal congestion cough rare wheezing body aches,, no loss of appetite, fever, chills, ear pain sore throat headache nausea vomiting diarrhea or dyspnea      Cough        Review of Systems   Respiratory:  Positive for cough.    All other systems reviewed and are negative.      Objective   Physical Exam  /90   Pulse 58   Temp 36.7 °C (98 °F)   Resp 16   Ht 1.702 m (5' 7\")   Wt 64 kg (141 lb)   SpO2 98%   BMI 22.08 kg/m²         Assessment/Plan   Problem List Items Addressed This Visit    None    COUGH PLAN Rest, sleep is important. Hydration important at least 6-8 glasses of water Take analgesics Tylenol.  Frequent hand washing and sanitize surroundings .Guaifenesin if tolerated.  Change toothbrush once recovered. Antimicrobial therapy as appropriate - Amoxicillin if not allergic, Azithromycin if with allergies.  If cough is keeping the patient awake will try cough suppressant for use at night only since it is sedating.  Call if shortness of breath, blood and sputum, change in character of cough, development of fever or chills.  If with inability to maintain nutrition and hydration seek emergent care. Avoid exposure to tobacco smoke and fumes.       "

## 2024-02-22 ENCOUNTER — TELEPHONE (OUTPATIENT)
Dept: PRIMARY CARE | Facility: CLINIC | Age: 84
End: 2024-02-22
Payer: COMMERCIAL

## 2024-02-22 NOTE — TELEPHONE ENCOUNTER
Cough & phlegm still ongoing.  Abx complete and has not resolved the issue.    Pt wants JTP to call him.

## 2024-03-21 DIAGNOSIS — E78.00 PURE HYPERCHOLESTEROLEMIA: ICD-10-CM

## 2024-03-21 DIAGNOSIS — I10 HYPERTENSION, UNSPECIFIED TYPE: Primary | ICD-10-CM

## 2024-03-21 RX ORDER — LOSARTAN POTASSIUM 100 MG/1
100 TABLET ORAL DAILY
Qty: 100 TABLET | Refills: 2 | Status: SHIPPED | OUTPATIENT
Start: 2024-03-21

## 2024-03-21 RX ORDER — EZETIMIBE 10 MG/1
10 TABLET ORAL NIGHTLY
Qty: 100 TABLET | Refills: 2 | Status: SHIPPED | OUTPATIENT
Start: 2024-03-21

## 2024-05-16 ENCOUNTER — LAB (OUTPATIENT)
Dept: LAB | Facility: LAB | Age: 84
End: 2024-05-16
Payer: COMMERCIAL

## 2024-05-16 DIAGNOSIS — N18.30 CHRONIC KIDNEY DISEASE, STAGE 3 UNSPECIFIED (MULTI): Primary | ICD-10-CM

## 2024-05-16 DIAGNOSIS — I10 ESSENTIAL (PRIMARY) HYPERTENSION: ICD-10-CM

## 2024-05-16 DIAGNOSIS — M10.00 IDIOPATHIC GOUT, UNSPECIFIED SITE: ICD-10-CM

## 2024-05-16 DIAGNOSIS — E78.49 OTHER HYPERLIPIDEMIA: ICD-10-CM

## 2024-05-16 LAB
ALBUMIN SERPL BCP-MCNC: 4.2 G/DL (ref 3.4–5)
ANION GAP SERPL CALC-SCNC: 16 MMOL/L (ref 10–20)
BASOPHILS # BLD AUTO: 0.03 X10*3/UL (ref 0–0.1)
BASOPHILS NFR BLD AUTO: 0.4 %
BUN SERPL-MCNC: 38 MG/DL (ref 6–23)
CALCIUM SERPL-MCNC: 9.7 MG/DL (ref 8.6–10.6)
CHLORIDE SERPL-SCNC: 104 MMOL/L (ref 98–107)
CO2 SERPL-SCNC: 21 MMOL/L (ref 21–32)
CREAT SERPL-MCNC: 1.85 MG/DL (ref 0.5–1.3)
CREAT UR-MCNC: 39.1 MG/DL (ref 20–370)
CREAT UR-MCNC: 39.1 MG/DL (ref 20–370)
EGFRCR SERPLBLD CKD-EPI 2021: 36 ML/MIN/1.73M*2
EOSINOPHIL # BLD AUTO: 0.25 X10*3/UL (ref 0–0.4)
EOSINOPHIL NFR BLD AUTO: 3.3 %
ERYTHROCYTE [DISTWIDTH] IN BLOOD BY AUTOMATED COUNT: 14.5 % (ref 11.5–14.5)
FERRITIN SERPL-MCNC: 423 NG/ML (ref 20–300)
GLUCOSE SERPL-MCNC: 94 MG/DL (ref 74–99)
HCT VFR BLD AUTO: 31.6 % (ref 41–52)
HGB BLD-MCNC: 10 G/DL (ref 13.5–17.5)
IMM GRANULOCYTES # BLD AUTO: 0.03 X10*3/UL (ref 0–0.5)
IMM GRANULOCYTES NFR BLD AUTO: 0.4 % (ref 0–0.9)
IRON SATN MFR SERPL: 27 % (ref 25–45)
IRON SERPL-MCNC: 102 UG/DL (ref 35–150)
LYMPHOCYTES # BLD AUTO: 1.27 X10*3/UL (ref 0.8–3)
LYMPHOCYTES NFR BLD AUTO: 16.6 %
MCH RBC QN AUTO: 32.4 PG (ref 26–34)
MCHC RBC AUTO-ENTMCNC: 31.6 G/DL (ref 32–36)
MCV RBC AUTO: 102 FL (ref 80–100)
MICROALBUMIN UR-MCNC: 575.1 MG/L
MICROALBUMIN/CREAT UR: 1470.8 UG/MG CREAT
MONOCYTES # BLD AUTO: 0.69 X10*3/UL (ref 0.05–0.8)
MONOCYTES NFR BLD AUTO: 9 %
NEUTROPHILS # BLD AUTO: 5.39 X10*3/UL (ref 1.6–5.5)
NEUTROPHILS NFR BLD AUTO: 70.3 %
NRBC BLD-RTO: 0 /100 WBCS (ref 0–0)
PHOSPHATE SERPL-MCNC: 4 MG/DL (ref 2.5–4.9)
PLATELET # BLD AUTO: 259 X10*3/UL (ref 150–450)
POTASSIUM SERPL-SCNC: 5.1 MMOL/L (ref 3.5–5.3)
PROT UR-ACNC: 78 MG/DL (ref 5–25)
PROT/CREAT UR: 1.99 MG/MG CREAT (ref 0–0.17)
RBC # BLD AUTO: 3.09 X10*6/UL (ref 4.5–5.9)
RBC #/AREA URNS AUTO: NORMAL /HPF
SODIUM SERPL-SCNC: 136 MMOL/L (ref 136–145)
TIBC SERPL-MCNC: 371 UG/DL (ref 240–445)
UIBC SERPL-MCNC: 269 UG/DL (ref 110–370)
WBC # BLD AUTO: 7.7 X10*3/UL (ref 4.4–11.3)
WBC #/AREA URNS AUTO: NORMAL /HPF

## 2024-05-16 PROCEDURE — 81001 URINALYSIS AUTO W/SCOPE: CPT

## 2024-05-16 PROCEDURE — 82570 ASSAY OF URINE CREATININE: CPT

## 2024-05-16 PROCEDURE — 83540 ASSAY OF IRON: CPT

## 2024-05-16 PROCEDURE — 83550 IRON BINDING TEST: CPT

## 2024-05-16 PROCEDURE — 85025 COMPLETE CBC W/AUTO DIFF WBC: CPT

## 2024-05-16 PROCEDURE — 84156 ASSAY OF PROTEIN URINE: CPT

## 2024-05-16 PROCEDURE — 82043 UR ALBUMIN QUANTITATIVE: CPT

## 2024-05-16 PROCEDURE — 82728 ASSAY OF FERRITIN: CPT

## 2024-05-16 PROCEDURE — 36415 COLL VENOUS BLD VENIPUNCTURE: CPT

## 2024-05-16 PROCEDURE — 80069 RENAL FUNCTION PANEL: CPT

## 2024-06-25 ENCOUNTER — APPOINTMENT (OUTPATIENT)
Dept: VASCULAR MEDICINE | Facility: HOSPITAL | Age: 84
End: 2024-06-25
Payer: MEDICARE

## 2024-06-25 ENCOUNTER — APPOINTMENT (OUTPATIENT)
Dept: CARDIOLOGY | Facility: HOSPITAL | Age: 84
End: 2024-06-25
Payer: MEDICARE

## 2024-06-26 ENCOUNTER — LAB (OUTPATIENT)
Dept: LAB | Facility: LAB | Age: 84
End: 2024-06-26
Payer: MEDICARE

## 2024-06-26 ENCOUNTER — HOSPITAL ENCOUNTER (OUTPATIENT)
Dept: VASCULAR MEDICINE | Facility: HOSPITAL | Age: 84
Discharge: HOME | End: 2024-06-26
Payer: MEDICARE

## 2024-06-26 ENCOUNTER — OFFICE VISIT (OUTPATIENT)
Dept: CARDIOLOGY | Facility: HOSPITAL | Age: 84
End: 2024-06-26
Payer: MEDICARE

## 2024-06-26 VITALS
HEART RATE: 35 BPM | DIASTOLIC BLOOD PRESSURE: 82 MMHG | OXYGEN SATURATION: 100 % | SYSTOLIC BLOOD PRESSURE: 142 MMHG | HEIGHT: 67 IN | WEIGHT: 141 LBS | BODY MASS INDEX: 22.13 KG/M2

## 2024-06-26 DIAGNOSIS — E78.00 PURE HYPERCHOLESTEROLEMIA: ICD-10-CM

## 2024-06-26 DIAGNOSIS — I25.10 CORONARY ARTERY DISEASE INVOLVING NATIVE CORONARY ARTERY OF NATIVE HEART WITHOUT ANGINA PECTORIS: ICD-10-CM

## 2024-06-26 DIAGNOSIS — I44.1 MOBITZ (TYPE) I (WENCKEBACH'S) ATRIOVENTRICULAR BLOCK: ICD-10-CM

## 2024-06-26 DIAGNOSIS — I25.10 CORONARY ARTERY DISEASE INVOLVING NATIVE CORONARY ARTERY OF NATIVE HEART WITHOUT ANGINA PECTORIS: Primary | ICD-10-CM

## 2024-06-26 DIAGNOSIS — I65.21 ASYMPTOMATIC STENOSIS OF RIGHT CAROTID ARTERY: ICD-10-CM

## 2024-06-26 DIAGNOSIS — I10 PRIMARY HYPERTENSION: ICD-10-CM

## 2024-06-26 DIAGNOSIS — I65.23 OCCLUSION AND STENOSIS OF BILATERAL CAROTID ARTERIES: ICD-10-CM

## 2024-06-26 DIAGNOSIS — Z95.1 S/P CABG X 5: ICD-10-CM

## 2024-06-26 LAB
ALT SERPL W P-5'-P-CCNC: 23 U/L (ref 10–52)
AST SERPL W P-5'-P-CCNC: 19 U/L (ref 9–39)
ATRIAL RATE: 58 BPM
CHOLEST SERPL-MCNC: 197 MG/DL (ref 0–199)
CHOLESTEROL/HDL RATIO: 2.6
HDLC SERPL-MCNC: 75.7 MG/DL
LDLC SERPL CALC-MCNC: 99 MG/DL
NON HDL CHOLESTEROL: 121 MG/DL (ref 0–149)
Q ONSET: 225 MS
QRS COUNT: 6 BEATS
QRS DURATION: 100 MS
QT INTERVAL: 450 MS
QTC CALCULATION(BAZETT): 343 MS
QTC FREDERICIA: 375 MS
R AXIS: -30 DEGREES
T AXIS: 78 DEGREES
T OFFSET: 450 MS
TRIGL SERPL-MCNC: 114 MG/DL (ref 0–149)
VENTRICULAR RATE: 35 BPM
VLDL: 23 MG/DL (ref 0–40)

## 2024-06-26 PROCEDURE — 93880 EXTRACRANIAL BILAT STUDY: CPT | Performed by: SURGERY

## 2024-06-26 PROCEDURE — 3079F DIAST BP 80-89 MM HG: CPT | Performed by: INTERNAL MEDICINE

## 2024-06-26 PROCEDURE — 1160F RVW MEDS BY RX/DR IN RCRD: CPT | Performed by: INTERNAL MEDICINE

## 2024-06-26 PROCEDURE — 1123F ACP DISCUSS/DSCN MKR DOCD: CPT | Performed by: INTERNAL MEDICINE

## 2024-06-26 PROCEDURE — 84450 TRANSFERASE (AST) (SGOT): CPT

## 2024-06-26 PROCEDURE — 84460 ALANINE AMINO (ALT) (SGPT): CPT

## 2024-06-26 PROCEDURE — 93005 ELECTROCARDIOGRAM TRACING: CPT | Performed by: INTERNAL MEDICINE

## 2024-06-26 PROCEDURE — 99214 OFFICE O/P EST MOD 30 MIN: CPT | Mod: 25 | Performed by: INTERNAL MEDICINE

## 2024-06-26 PROCEDURE — 1036F TOBACCO NON-USER: CPT | Performed by: INTERNAL MEDICINE

## 2024-06-26 PROCEDURE — 93880 EXTRACRANIAL BILAT STUDY: CPT

## 2024-06-26 PROCEDURE — 1159F MED LIST DOCD IN RCRD: CPT | Performed by: INTERNAL MEDICINE

## 2024-06-26 PROCEDURE — 3077F SYST BP >= 140 MM HG: CPT | Performed by: INTERNAL MEDICINE

## 2024-06-26 PROCEDURE — G2211 COMPLEX E/M VISIT ADD ON: HCPCS | Performed by: INTERNAL MEDICINE

## 2024-06-26 PROCEDURE — 80061 LIPID PANEL: CPT

## 2024-06-26 PROCEDURE — 99214 OFFICE O/P EST MOD 30 MIN: CPT | Performed by: INTERNAL MEDICINE

## 2024-06-26 RX ORDER — DAPAGLIFLOZIN 10 MG/1
1 TABLET, FILM COATED ORAL
COMMUNITY
Start: 2024-03-14

## 2024-06-26 NOTE — PATIENT INSTRUCTIONS
Consider taking something for your blood pressure and your cholesterol.  Check a lipid profile, AST, and ALT.  Follow-up in 6 months.

## 2024-06-26 NOTE — PROGRESS NOTES
Primary Care Physician: Stanislav Rousseau MD  Date of Visit: 06/26/2024  8:40 AM EDT  Location of visit: [facility]     Chief Complaint:   Chief Complaint   Patient presents with    Follow-up     6 month        HPI / Summary:   Martín Capellan is a 83 y.o. male presents for followup.  He has no cardiac complaints.  He is generally active and walks on a regular basis in addition to playing golf and doing yard work without chest pain or shortness of breath.  The patient denies chest pain, shortness of breath, palpitations, lightheadedness, syncope, orthopnea, paroxysmal nocturnal dyspnea, lower extremity edema, or bleeding problems.    He does monitor his blood pressure at home and his systolic blood pressure ranges from the low 100s to 140s.    He stopped taking doxazosin and chlorthalidone secondary to fatigue and lower extremity edema and lightheadedness.      History reviewed. No pertinent past medical history.     Past Surgical History:   Procedure Laterality Date    CORONARY ARTERY BYPASS GRAFT  04/26/2022    CABG          Social History:   reports that he has quit smoking. His smoking use included cigarettes. He has never used smokeless tobacco. He reports current alcohol use of about 2.0 standard drinks of alcohol per week. He reports that he does not currently use drugs.     Family History:  family history includes No Known Problems in his father and mother.      Allergies:  No Known Allergies    Outpatient Medications:  Current Outpatient Medications   Medication Instructions    aspirin 81 mg chewable tablet 1 tablet, oral, Daily    cholecalciferol, vitamin D3, (VITAMIN D3 ORAL) 1 tablet, oral, Daily RT    cyanocobalamin (VITAMIN B-12) 50 mcg, oral, Daily    ezetimibe (ZETIA) 10 mg, oral, Nightly    Farxiga 10 mg 1 tablet, oral, Daily (0630)    losartan (COZAAR) 100 mg, oral, Daily    magnesium oxide (Mag-Ox) 400 mg tablet 1 tablet, oral, Daily    NON FORMULARY RED BEET SUPPLEMENT - TAKE 1 CHEWABLE  "GUMMY DAILY       Physical Exam:  Vitals:    06/26/24 0903   BP: 142/82   Pulse: (!) 35   SpO2: 100%   Weight: 64 kg (141 lb)   Height: 1.702 m (5' 7\")     Wt Readings from Last 5 Encounters:   06/26/24 64 kg (141 lb)   02/01/24 64 kg (141 lb)   01/04/24 64 kg (141 lb)   10/04/23 63.5 kg (140 lb)   05/26/23 63.5 kg (140 lb)     Body mass index is 22.08 kg/m².   Left upper extremity blood pressure 170/70  Right upper extremity blood pressure 172/70    General: Well-developed well-nourished in no acute distress  HEENT: Normocephalic atraumatic  Neck: Supple, JVP is normal negative hepatojugular reflux 2+ carotid pulses right carotid bruit   Pulmonary: Normal respiratory effort, clear to auscultation  Cardiovascular: No right ventricular heave, normal S1 and S2, no murmurs rubs or gallops  Abdomen: Soft nontender nondistended  Extremities: Warm without edema 2+ radial pulses bilaterally  Neurologic: Alert and oriented x3  Psychiatric: Normal mood and affect     Last Labs:  CMP:  Recent Labs     05/16/24  0844 12/13/23  1110 08/14/23  0940    136 137   K 5.1 5.3 5.0    104 104   CO2 21 23 23   ANIONGAP 16 14 15   BUN 38* 52* 36*   CREATININE 1.85* 1.97* 2.00*   EGFR 36* 33*  --    GLUCOSE 94 93 100*     Recent Labs     05/16/24  0844 12/13/23  1110 08/14/23  0940 03/29/23  0848 11/09/22  0842 03/02/22  1130 05/07/21  0831 10/08/20  1032 05/21/20  0829   ALBUMIN 4.2 4.2 4.2  --    < >  --   --   --  4.4   ALKPHOS  --   --   --   --   --   --   --   --  56   ALT  --   --   --  18  --  24 20   < > 24   AST  --   --   --   --   --   --   --   --  23   BILITOT  --   --   --   --   --   --   --   --  0.5    < > = values in this interval not displayed.     CBC:  Recent Labs     05/16/24  0844 12/13/23  1110 03/29/23  0848   WBC 7.7 7.2 6.3   HGB 10.0* 9.1* 10.3*   HCT 31.6* 28.3* 32.2*    255 216   * 104* 101*     COAG: No results for input(s): \"INR\", \"DDIMERVTE\" in the last 87840 " "hours.  HEME/ENDO:  Recent Labs     05/16/24  0844 08/14/23  0940 03/29/23  0848 11/09/22  0842 03/02/22  1130 05/07/21  0831 02/03/21  0825 10/08/20  1032 07/17/19  0909   FERRITIN 423* 393*  --  574*  --   --   --   --   --    IRONSAT 27 43  --  25  --   --   --   --   --    TSH  --   --  1.75  --  0.93 0.80 0.33*   < > 0.84   HGBA1C  --   --   --   --   --   --  5.8  --  5.5    < > = values in this interval not displayed.      CARDIAC: No results for input(s): \"LDH\", \"CKMB\", \"TROPHS\", \"BNP\" in the last 16495 hours.    No lab exists for component: \"CK\", \"CKMBP\"  Recent Labs     03/29/23  0848 01/25/23  1533 03/02/22  1130   CHOL 162 127 231*   LDLF 73 64 130*   HDL 68.6 40.2 76.3   TRIG 103 114 126       Last Cardiology Tests:  ECG:  Electrocardiogram performed today that I reviewed shows sinus bradycardia with first-degree AV block Mobitz type I second-degree AV block prior anterior septal infarct left axis deviation    Echo:  Echocardiogram March 25, 2017  CONCLUSIONS:   1. The left ventricular systolic function is hyperdynamic with a 65-70% estimated ejection fraction.   2. Spectral Doppler shows an impaired relaxation pattern of left ventricular diastolic filling.   3. The left atrium is upper limits of normal in size.    Cath:  Cardiac catheterization March 24, 2017  Coronary Lesion Summary:  Vessel         Stenosis   Vessel Segment  Left Main    90% stenosis     distal  LAD          80% stenosis     ostial  2nd Diagonal 80% stenosis     ostial  Circumflex   80% stenosis     ostial  Circumflex   40% stenosis      mid  Ramus        95% stenosis     ostial  CONCLUSIONS:   1. Severe CAD involving distal LM, ostial LAD, LCX, ramus and diagonal.   2. Elevated LVEDP.   3. No aortic stenosis.    Stress Test:  Stress Results:  No results found for this or any previous visit from the past 365 days.         Cardiac Imaging:  Carotid ultrasound March 23, 2023  Right Carotid: Findings are consistent with greater than 70% " stenosis of the right proximal ICA. Turbulent flow seen by color Doppler. There is a >50% stenosis noted in the right external carotid artery. No evidence of hemodynamically significant stenosis of the right common carotid artery. Abnormal pre steal waveforms are visualized in the right vertebral artery suggestive of proximal stenosis. There is a >50% stenosis noted in the right subclavian artery.     Left Carotid: Findings are consistent with 50 to 69% stenosis of the left proximal ICA. Turbulent flow seen by color Doppler. Unable to obtain a doppler signal in the left ECA that is suggestive of occlusion. No evidence of hemodynamically significant stenosis of the left common carotid artery. The left vertebral artery is patent with antegrade flow. No evidence of hemodynamically significant stenosis in the left subclavian artery.      Assessment/Plan   Diagnoses and all orders for this visit:  Coronary artery disease involving native coronary artery of native heart without angina pectoris  -     ECG 12 lead (Clinic Performed)  Pure hypercholesterolemia  -     Lipid panel; Future  -     ALT; Future  -     AST; Future  Primary hypertension  S/P CABG x 5  Mobitz (type) I (Wenckebach's) atrioventricular block  Asymptomatic stenosis of right carotid artery    In summary Mr. 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  He is asymptomatic from a cardiac perspective.  He is euvolemic on exam.  His carotid ultrasound shows no significant change from his prior ultrasound.  He was seen by vascular surgery and declined intervention.  His blood pressure today is elevated.   His home readings are better.  He does not wish to take any additional medications for his blood pressure.  His last lipid profile was not at goal.  I did order labs as indicated below.  He declined any further medications for his cholesterol.  He should continue his current cardiovascular medications.  We will see him back in follow-up in 6 months.      Orders:  Orders Placed This Encounter   Procedures    Lipid panel    ALT    AST    ECG 12 lead (Clinic Performed)      Followup Appts:  Future Appointments   Date Time Provider Department Center   1/9/2025  8:40 AM Liliana Altamirano, APRN-CNP [id number]           ____________________________________________________________  John Jimenez MD  [facility]  [facility]

## 2024-07-11 ENCOUNTER — LAB (OUTPATIENT)
Dept: LAB | Facility: LAB | Age: 84
End: 2024-07-11
Payer: COMMERCIAL

## 2024-07-11 DIAGNOSIS — E78.49 OTHER HYPERLIPIDEMIA: Primary | ICD-10-CM

## 2024-07-11 DIAGNOSIS — M10.00 IDIOPATHIC GOUT, UNSPECIFIED SITE: ICD-10-CM

## 2024-07-11 LAB
25(OH)D3 SERPL-MCNC: 51 NG/ML (ref 30–100)
ALBUMIN SERPL BCP-MCNC: 4.6 G/DL (ref 3.4–5)
ANION GAP SERPL CALC-SCNC: 14 MMOL/L (ref 10–20)
BUN SERPL-MCNC: 47 MG/DL (ref 6–23)
CALCIUM SERPL-MCNC: 9.7 MG/DL (ref 8.6–10.6)
CHLORIDE SERPL-SCNC: 105 MMOL/L (ref 98–107)
CO2 SERPL-SCNC: 23 MMOL/L (ref 21–32)
CREAT SERPL-MCNC: 2.24 MG/DL (ref 0.5–1.3)
EGFRCR SERPLBLD CKD-EPI 2021: 29 ML/MIN/1.73M*2
ERYTHROCYTE [DISTWIDTH] IN BLOOD BY AUTOMATED COUNT: 14.8 % (ref 11.5–14.5)
GLUCOSE SERPL-MCNC: 96 MG/DL (ref 74–99)
HCT VFR BLD AUTO: 31.4 % (ref 41–52)
HGB BLD-MCNC: 10.1 G/DL (ref 13.5–17.5)
MCH RBC QN AUTO: 32.4 PG (ref 26–34)
MCHC RBC AUTO-ENTMCNC: 32.2 G/DL (ref 32–36)
MCV RBC AUTO: 101 FL (ref 80–100)
NRBC BLD-RTO: 0 /100 WBCS (ref 0–0)
PHOSPHATE SERPL-MCNC: 3.3 MG/DL (ref 2.5–4.9)
PLATELET # BLD AUTO: 236 X10*3/UL (ref 150–450)
POTASSIUM SERPL-SCNC: 5.7 MMOL/L (ref 3.5–5.3)
PROT SERPL-MCNC: 7 G/DL (ref 6.4–8.2)
PROT UR-ACNC: 34 MG/DL (ref 5–25)
RBC # BLD AUTO: 3.12 X10*6/UL (ref 4.5–5.9)
SODIUM SERPL-SCNC: 136 MMOL/L (ref 136–145)
WBC # BLD AUTO: 5.9 X10*3/UL (ref 4.4–11.3)

## 2024-07-11 PROCEDURE — 85027 COMPLETE CBC AUTOMATED: CPT

## 2024-07-11 PROCEDURE — 84166 PROTEIN E-PHORESIS/URINE/CSF: CPT

## 2024-07-11 PROCEDURE — 83521 IG LIGHT CHAINS FREE EACH: CPT

## 2024-07-11 PROCEDURE — 84156 ASSAY OF PROTEIN URINE: CPT

## 2024-07-11 PROCEDURE — 84165 PROTEIN E-PHORESIS SERUM: CPT

## 2024-07-11 PROCEDURE — 82306 VITAMIN D 25 HYDROXY: CPT

## 2024-07-11 PROCEDURE — 86334 IMMUNOFIX E-PHORESIS SERUM: CPT

## 2024-07-11 PROCEDURE — 80069 RENAL FUNCTION PANEL: CPT

## 2024-07-11 PROCEDURE — 84155 ASSAY OF PROTEIN SERUM: CPT

## 2024-07-12 LAB
KAPPA LC SERPL-MCNC: 10.93 MG/DL (ref 0.33–1.94)
KAPPA LC/LAMBDA SER: 3.74 {RATIO} (ref 0.26–1.65)
LAMBDA LC SERPL-MCNC: 2.92 MG/DL (ref 0.57–2.63)

## 2024-07-16 LAB
ALBUMIN MFR UR ELPH: 77.7 %
ALBUMIN: 4.5 G/DL (ref 3.4–5)
ALPHA 1 GLOBULIN: 0.3 G/DL (ref 0.2–0.6)
ALPHA 2 GLOBULIN: 0.7 G/DL (ref 0.4–1.1)
ALPHA1 GLOB MFR UR ELPH: 4.5 %
ALPHA2 GLOB MFR UR ELPH: 4.3 %
ATRIAL RATE: 58 BPM
B-GLOBULIN MFR UR ELPH: 7.9 %
BETA GLOBULIN: 0.9 G/DL (ref 0.5–1.2)
GAMMA GLOB MFR UR ELPH: 5.6 %
GAMMA GLOBULIN: 0.7 G/DL (ref 0.5–1.4)
IMMUNOFIXATION COMMENT: NORMAL
PATH REVIEW - SERUM IMMUNOFIXATION: NORMAL
PATH REVIEW-SERUM PROTEIN ELECTROPHORESIS: NORMAL
PATH REVIEW-URINE PROTEIN ELECTROPHORESIS: NORMAL
PROTEIN ELECTROPHORESIS COMMENT: NORMAL
Q ONSET: 225 MS
QRS COUNT: 6 BEATS
QRS DURATION: 100 MS
QT INTERVAL: 450 MS
QTC CALCULATION(BAZETT): 343 MS
QTC FREDERICIA: 375 MS
R AXIS: -30 DEGREES
T AXIS: 78 DEGREES
T OFFSET: 450 MS
URINE ELECTROPHORESIS COMMENT: NORMAL
VENTRICULAR RATE: 35 BPM

## 2024-09-16 ENCOUNTER — LAB (OUTPATIENT)
Dept: LAB | Facility: LAB | Age: 84
End: 2024-09-16
Payer: COMMERCIAL

## 2024-09-16 DIAGNOSIS — I10 ESSENTIAL (PRIMARY) HYPERTENSION: Primary | ICD-10-CM

## 2024-09-16 DIAGNOSIS — R80.0 ISOLATED PROTEINURIA: ICD-10-CM

## 2024-09-16 LAB
ALBUMIN SERPL BCP-MCNC: 4.4 G/DL (ref 3.4–5)
ANION GAP SERPL CALC-SCNC: 13 MMOL/L (ref 10–20)
BUN SERPL-MCNC: 43 MG/DL (ref 6–23)
CALCIUM SERPL-MCNC: 9.5 MG/DL (ref 8.6–10.6)
CHLORIDE SERPL-SCNC: 109 MMOL/L (ref 98–107)
CO2 SERPL-SCNC: 23 MMOL/L (ref 21–32)
CREAT SERPL-MCNC: 2.28 MG/DL (ref 0.5–1.3)
CREAT UR-MCNC: 35.8 MG/DL (ref 20–370)
EGFRCR SERPLBLD CKD-EPI 2021: 28 ML/MIN/1.73M*2
GLUCOSE SERPL-MCNC: 99 MG/DL (ref 74–99)
MICROALBUMIN UR-MCNC: 209.4 MG/L
MICROALBUMIN/CREAT UR: 584.9 UG/MG CREAT
PHOSPHATE SERPL-MCNC: 4 MG/DL (ref 2.5–4.9)
POTASSIUM SERPL-SCNC: 5.2 MMOL/L (ref 3.5–5.3)
SODIUM SERPL-SCNC: 140 MMOL/L (ref 136–145)

## 2024-09-16 PROCEDURE — 80069 RENAL FUNCTION PANEL: CPT

## 2024-09-16 PROCEDURE — 36415 COLL VENOUS BLD VENIPUNCTURE: CPT

## 2024-09-16 PROCEDURE — 82570 ASSAY OF URINE CREATININE: CPT

## 2024-09-16 PROCEDURE — 82043 UR ALBUMIN QUANTITATIVE: CPT

## 2024-10-03 DIAGNOSIS — Z00.00 WELLNESS EXAMINATION: ICD-10-CM

## 2024-10-11 ENCOUNTER — LAB (OUTPATIENT)
Dept: LAB | Facility: LAB | Age: 84
End: 2024-10-11
Payer: COMMERCIAL

## 2024-10-11 DIAGNOSIS — Z00.00 WELLNESS EXAMINATION: ICD-10-CM

## 2024-10-11 LAB
25(OH)D3 SERPL-MCNC: 46 NG/ML (ref 30–100)
ALT SERPL W P-5'-P-CCNC: 23 U/L (ref 10–52)
ANION GAP SERPL CALC-SCNC: 13 MMOL/L (ref 10–20)
APPEARANCE UR: CLEAR
BASOPHILS # BLD AUTO: 0.02 X10*3/UL (ref 0–0.1)
BASOPHILS NFR BLD AUTO: 0.4 %
BILIRUB UR STRIP.AUTO-MCNC: NEGATIVE MG/DL
BUN SERPL-MCNC: 39 MG/DL (ref 6–23)
CALCIUM SERPL-MCNC: 9.4 MG/DL (ref 8.6–10.6)
CHLORIDE SERPL-SCNC: 105 MMOL/L (ref 98–107)
CHOLEST SERPL-MCNC: 158 MG/DL (ref 0–199)
CHOLESTEROL/HDL RATIO: 2.7
CO2 SERPL-SCNC: 25 MMOL/L (ref 21–32)
COLOR UR: COLORLESS
CREAT SERPL-MCNC: 2.12 MG/DL (ref 0.5–1.3)
EGFRCR SERPLBLD CKD-EPI 2021: 30 ML/MIN/1.73M*2
EOSINOPHIL # BLD AUTO: 0.21 X10*3/UL (ref 0–0.4)
EOSINOPHIL NFR BLD AUTO: 4.3 %
ERYTHROCYTE [DISTWIDTH] IN BLOOD BY AUTOMATED COUNT: 14.1 % (ref 11.5–14.5)
EST. AVERAGE GLUCOSE BLD GHB EST-MCNC: 114 MG/DL
GLUCOSE SERPL-MCNC: 100 MG/DL (ref 74–99)
GLUCOSE UR STRIP.AUTO-MCNC: ABNORMAL MG/DL
HBA1C MFR BLD: 5.6 %
HCT VFR BLD AUTO: 31.9 % (ref 41–52)
HDLC SERPL-MCNC: 59.5 MG/DL
HGB BLD-MCNC: 10.2 G/DL (ref 13.5–17.5)
IMM GRANULOCYTES # BLD AUTO: 0.02 X10*3/UL (ref 0–0.5)
IMM GRANULOCYTES NFR BLD AUTO: 0.4 % (ref 0–0.9)
KETONES UR STRIP.AUTO-MCNC: NEGATIVE MG/DL
LDLC SERPL CALC-MCNC: 85 MG/DL
LEUKOCYTE ESTERASE UR QL STRIP.AUTO: NEGATIVE
LYMPHOCYTES # BLD AUTO: 0.89 X10*3/UL (ref 0.8–3)
LYMPHOCYTES NFR BLD AUTO: 18.3 %
MCH RBC QN AUTO: 31.9 PG (ref 26–34)
MCHC RBC AUTO-ENTMCNC: 32 G/DL (ref 32–36)
MCV RBC AUTO: 100 FL (ref 80–100)
MONOCYTES # BLD AUTO: 0.54 X10*3/UL (ref 0.05–0.8)
MONOCYTES NFR BLD AUTO: 11.1 %
NEUTROPHILS # BLD AUTO: 3.19 X10*3/UL (ref 1.6–5.5)
NEUTROPHILS NFR BLD AUTO: 65.5 %
NITRITE UR QL STRIP.AUTO: NEGATIVE
NON HDL CHOLESTEROL: 99 MG/DL (ref 0–149)
NRBC BLD-RTO: 0 /100 WBCS (ref 0–0)
PH UR STRIP.AUTO: 6.5 [PH]
PLATELET # BLD AUTO: 213 X10*3/UL (ref 150–450)
POTASSIUM SERPL-SCNC: 4.9 MMOL/L (ref 3.5–5.3)
PROT UR STRIP.AUTO-MCNC: ABNORMAL MG/DL
PSA SERPL-MCNC: 3.39 NG/ML
RBC # BLD AUTO: 3.2 X10*6/UL (ref 4.5–5.9)
RBC # UR STRIP.AUTO: NEGATIVE /UL
RBC #/AREA URNS AUTO: NORMAL /HPF
SODIUM SERPL-SCNC: 138 MMOL/L (ref 136–145)
SP GR UR STRIP.AUTO: 1.01
TRIGL SERPL-MCNC: 67 MG/DL (ref 0–149)
TSH SERPL-ACNC: 0.93 MIU/L (ref 0.44–3.98)
UROBILINOGEN UR STRIP.AUTO-MCNC: NORMAL MG/DL
VIT B12 SERPL-MCNC: >2000 PG/ML (ref 211–911)
VLDL: 13 MG/DL (ref 0–40)
WBC # BLD AUTO: 4.9 X10*3/UL (ref 4.4–11.3)
WBC #/AREA URNS AUTO: NORMAL /HPF

## 2024-10-11 PROCEDURE — 82306 VITAMIN D 25 HYDROXY: CPT

## 2024-10-11 PROCEDURE — 85025 COMPLETE CBC W/AUTO DIFF WBC: CPT

## 2024-10-11 PROCEDURE — 83036 HEMOGLOBIN GLYCOSYLATED A1C: CPT

## 2024-10-11 PROCEDURE — 80048 BASIC METABOLIC PNL TOTAL CA: CPT

## 2024-10-11 PROCEDURE — 80061 LIPID PANEL: CPT

## 2024-10-11 PROCEDURE — 84460 ALANINE AMINO (ALT) (SGPT): CPT

## 2024-10-11 PROCEDURE — 84443 ASSAY THYROID STIM HORMONE: CPT

## 2024-10-11 PROCEDURE — 82607 VITAMIN B-12: CPT

## 2024-10-11 PROCEDURE — 81001 URINALYSIS AUTO W/SCOPE: CPT

## 2024-10-11 PROCEDURE — 84153 ASSAY OF PSA TOTAL: CPT

## 2024-10-18 ENCOUNTER — APPOINTMENT (OUTPATIENT)
Dept: PRIMARY CARE | Facility: CLINIC | Age: 84
End: 2024-10-18
Payer: COMMERCIAL

## 2024-10-18 VITALS
RESPIRATION RATE: 16 BRPM | OXYGEN SATURATION: 98 % | SYSTOLIC BLOOD PRESSURE: 180 MMHG | HEART RATE: 51 BPM | DIASTOLIC BLOOD PRESSURE: 70 MMHG | BODY MASS INDEX: 21.5 KG/M2 | HEIGHT: 67 IN | TEMPERATURE: 97.8 F | WEIGHT: 137 LBS

## 2024-10-18 DIAGNOSIS — Z23 FLU VACCINE NEED: ICD-10-CM

## 2024-10-18 DIAGNOSIS — Z00.00 WELL ADULT EXAM: Primary | ICD-10-CM

## 2024-10-18 ASSESSMENT — ENCOUNTER SYMPTOMS
OCCASIONAL FEELINGS OF UNSTEADINESS: 0
LOSS OF SENSATION IN FEET: 0
DEPRESSION: 0

## 2024-10-18 ASSESSMENT — ACTIVITIES OF DAILY LIVING (ADL)
DOING_HOUSEWORK: INDEPENDENT
TAKING_MEDICATION: INDEPENDENT
BATHING: INDEPENDENT
GROCERY_SHOPPING: INDEPENDENT
MANAGING_FINANCES: INDEPENDENT
DRESSING: INDEPENDENT

## 2024-10-18 ASSESSMENT — PATIENT HEALTH QUESTIONNAIRE - PHQ9
5. POOR APPETITE OR OVEREATING: NOT AT ALL
8. MOVING OR SPEAKING SO SLOWLY THAT OTHER PEOPLE COULD HAVE NOTICED. OR THE OPPOSITE, BEING SO FIGETY OR RESTLESS THAT YOU HAVE BEEN MOVING AROUND A LOT MORE THAN USUAL: NOT AT ALL
1. LITTLE INTEREST OR PLEASURE IN DOING THINGS: NOT AT ALL
2. FEELING DOWN, DEPRESSED OR HOPELESS: NOT AT ALL
SUM OF ALL RESPONSES TO PHQ QUESTIONS 1-9: 0
7. TROUBLE CONCENTRATING ON THINGS, SUCH AS READING THE NEWSPAPER OR WATCHING TELEVISION: NOT AT ALL
SUM OF ALL RESPONSES TO PHQ9 QUESTIONS 1 AND 2: 0
3. TROUBLE FALLING OR STAYING ASLEEP OR SLEEPING TOO MUCH: NOT AT ALL
9. THOUGHTS THAT YOU WOULD BE BETTER OFF DEAD, OR OF HURTING YOURSELF: NOT AT ALL
4. FEELING TIRED OR HAVING LITTLE ENERGY: NOT AT ALL
6. FEELING BAD ABOUT YOURSELF - OR THAT YOU ARE A FAILURE OR HAVE LET YOURSELF OR YOUR FAMILY DOWN: NOT AT ALL

## 2024-10-18 NOTE — PROGRESS NOTES
Subjective   Patient ID: Martín Capellan is a 84 y.o. male who presents for Medicare Annual Wellness Visit Subsequent and Flu Vaccine.    Patient comes in for a physical examination, doing well over - all with no particular complaints.   Also in for laboratory review and health maintenance update.  Updating family history as well.          Review of Systems   All other systems reviewed and are negative.      Previous history  No past medical history on file.  Past Surgical History:   Procedure Laterality Date    CORONARY ARTERY BYPASS GRAFT  04/26/2022    CABG     Social History     Tobacco Use    Smoking status: Former     Types: Cigarettes    Smokeless tobacco: Never   Substance Use Topics    Alcohol use: Yes     Alcohol/week: 2.0 standard drinks of alcohol     Types: 2 Standard drinks or equivalent per week     Comment: socially    Drug use: Not Currently     Family History   Problem Relation Name Age of Onset    No Known Problems Mother      No Known Problems Father       No Known Allergies  Current Outpatient Medications   Medication Instructions    aspirin 81 mg chewable tablet 1 tablet, oral, Daily    cholecalciferol, vitamin D3, (VITAMIN D3 ORAL) 1 tablet, oral, Daily RT    cyanocobalamin (VITAMIN B-12) 50 mcg, oral, Daily    ezetimibe (ZETIA) 10 mg, oral, Nightly    Farxiga 10 mg 1 tablet, oral, Daily (0630)    losartan (COZAAR) 100 mg, oral, Daily    magnesium oxide (Mag-Ox) 400 mg tablet 1 tablet, oral, Daily    NON FORMULARY RED BEET SUPPLEMENT - TAKE 1 CHEWABLE GUMMY DAILY       Objective       Physical Exam  Constitutional:       Appearance: Normal appearance.   HENT:      Head: Normocephalic and atraumatic.      Nose: Nose normal.   Cardiovascular:      Rate and Rhythm: Normal rate and regular rhythm.   Abdominal:      General: Abdomen is flat.      Palpations: Abdomen is soft.   Musculoskeletal:         General: Normal range of motion.      Cervical back: Normal range of motion.   Skin:     General:  Skin is warm and dry.   Neurological:      Mental Status: He is alert.           Assessment/Plan   Martín Capellan is a 84 y.o. male who presents for the concerns below:    Problem List Items Addressed This Visit       Flu vaccine need    Relevant Orders    Flu vaccine, trivalent, preservative free, HIGH-DOSE, age 65y+ (Fluzone) (Completed)    Well adult exam - Primary          ASSESSMENT AND PLAN: Patient on examination is in fair health except for medical conditions discussed above, obtained screening blood tests to screen for high cholesterol, diabetes, thyroid, Ekg if above 50 years old or earlier if with cardiac history. Patient should be taking enough calcium in a balanced diet  Weight control especially if BMI is above ideal range. For Female Patients Only:  Mammogram yearly and PAP test with gynecology unless s/p Total hysterectomy  Bone density test at age 60 and above and every two years after that. Preventive Medicine: colon cancer screening by age 45 if no family history as well PSA @ 50 , balanced diet, and exercise as discussed. Seat belt use for injury prevention, living will. Substance use and /or tobacco use counseled when applicable. Alcohol use discussed, use designated . Immunizations TD age 50 and every 10 years. Pneumovax and shingles vaccine counseled. Yearly flu vaccine unless contraindicated. More than 50% of office visit time spent counseling the patient, questions were answered. If problems arise, patient is to call or come back in. It is understood that the responsibility of healthcare is shared by the patient by following a healthy lifestyle and following the plan above as discussed. Advised complete physical examination every year. Pending additional results, may need to come for a return office visit for follow-up.     Discussed with:   Return in :    Portions of this note were generated using digital voice recognition software, and may contain grammatical errors       Stanislav ARMENTA  MD Soham  10/18/24  11:22 AM

## 2025-01-03 ENCOUNTER — LAB (OUTPATIENT)
Dept: LAB | Facility: LAB | Age: 85
End: 2025-01-03
Payer: COMMERCIAL

## 2025-01-03 DIAGNOSIS — M10.00 IDIOPATHIC GOUT, UNSPECIFIED SITE: ICD-10-CM

## 2025-01-03 DIAGNOSIS — N18.30 CHRONIC KIDNEY DISEASE, STAGE 3 UNSPECIFIED (MULTI): ICD-10-CM

## 2025-01-03 DIAGNOSIS — I10 ESSENTIAL (PRIMARY) HYPERTENSION: ICD-10-CM

## 2025-01-03 DIAGNOSIS — E78.49 OTHER HYPERLIPIDEMIA: ICD-10-CM

## 2025-01-03 DIAGNOSIS — D64.9 ANEMIA, UNSPECIFIED: Primary | ICD-10-CM

## 2025-01-03 LAB
ALBUMIN SERPL BCP-MCNC: 4.5 G/DL (ref 3.4–5)
ANION GAP SERPL CALC-SCNC: 13 MMOL/L (ref 10–20)
APPEARANCE UR: CLEAR
BILIRUB UR STRIP.AUTO-MCNC: NEGATIVE MG/DL
BUN SERPL-MCNC: 41 MG/DL (ref 6–23)
CALCIUM SERPL-MCNC: 9.6 MG/DL (ref 8.6–10.6)
CHLORIDE SERPL-SCNC: 103 MMOL/L (ref 98–107)
CO2 SERPL-SCNC: 24 MMOL/L (ref 21–32)
COLOR UR: COLORLESS
CREAT SERPL-MCNC: 2.11 MG/DL (ref 0.5–1.3)
CREAT UR-MCNC: 40.8 MG/DL (ref 20–370)
CREAT UR-MCNC: 40.8 MG/DL (ref 20–370)
EGFRCR SERPLBLD CKD-EPI 2021: 30 ML/MIN/1.73M*2
ERYTHROCYTE [DISTWIDTH] IN BLOOD BY AUTOMATED COUNT: 15.1 % (ref 11.5–14.5)
FERRITIN SERPL-MCNC: 383 NG/ML (ref 20–300)
GLUCOSE SERPL-MCNC: 95 MG/DL (ref 74–99)
GLUCOSE UR STRIP.AUTO-MCNC: ABNORMAL MG/DL
HCT VFR BLD AUTO: 33 % (ref 41–52)
HGB BLD-MCNC: 10.5 G/DL (ref 13.5–17.5)
IRON SATN MFR SERPL: 38 % (ref 25–45)
IRON SERPL-MCNC: 137 UG/DL (ref 35–150)
KETONES UR STRIP.AUTO-MCNC: NEGATIVE MG/DL
LEUKOCYTE ESTERASE UR QL STRIP.AUTO: NEGATIVE
MCH RBC QN AUTO: 31.6 PG (ref 26–34)
MCHC RBC AUTO-ENTMCNC: 31.8 G/DL (ref 32–36)
MCV RBC AUTO: 99 FL (ref 80–100)
MICROALBUMIN UR-MCNC: 278.1 MG/L
MICROALBUMIN/CREAT UR: 681.6 UG/MG CREAT
NITRITE UR QL STRIP.AUTO: NEGATIVE
NRBC BLD-RTO: 0 /100 WBCS (ref 0–0)
PH UR STRIP.AUTO: 6 [PH]
PHOSPHATE SERPL-MCNC: 4 MG/DL (ref 2.5–4.9)
PLATELET # BLD AUTO: 228 X10*3/UL (ref 150–450)
POTASSIUM SERPL-SCNC: 4.9 MMOL/L (ref 3.5–5.3)
PROT UR STRIP.AUTO-MCNC: ABNORMAL MG/DL
PROT UR-ACNC: 46 MG/DL (ref 5–25)
PROT/CREAT UR: 1.13 MG/MG CREAT (ref 0–0.17)
PTH-INTACT SERPL-MCNC: 97.3 PG/ML (ref 18.5–88)
RBC # BLD AUTO: 3.32 X10*6/UL (ref 4.5–5.9)
RBC # UR STRIP.AUTO: NEGATIVE /UL
RBC #/AREA URNS AUTO: NORMAL /HPF
SODIUM SERPL-SCNC: 135 MMOL/L (ref 136–145)
SP GR UR STRIP.AUTO: 1.01
TIBC SERPL-MCNC: 360 UG/DL (ref 240–445)
UIBC SERPL-MCNC: 223 UG/DL (ref 110–370)
URATE SERPL-MCNC: 7.5 MG/DL (ref 4–7.5)
UROBILINOGEN UR STRIP.AUTO-MCNC: NORMAL MG/DL
WBC # BLD AUTO: 4.5 X10*3/UL (ref 4.4–11.3)
WBC #/AREA URNS AUTO: NORMAL /HPF

## 2025-01-03 PROCEDURE — 84156 ASSAY OF PROTEIN URINE: CPT

## 2025-01-03 PROCEDURE — 80069 RENAL FUNCTION PANEL: CPT

## 2025-01-03 PROCEDURE — 83970 ASSAY OF PARATHORMONE: CPT

## 2025-01-03 PROCEDURE — 85027 COMPLETE CBC AUTOMATED: CPT

## 2025-01-03 PROCEDURE — 83550 IRON BINDING TEST: CPT

## 2025-01-03 PROCEDURE — 83540 ASSAY OF IRON: CPT

## 2025-01-03 PROCEDURE — 82570 ASSAY OF URINE CREATININE: CPT

## 2025-01-03 PROCEDURE — 82728 ASSAY OF FERRITIN: CPT

## 2025-01-03 PROCEDURE — 84550 ASSAY OF BLOOD/URIC ACID: CPT

## 2025-01-03 PROCEDURE — 82043 UR ALBUMIN QUANTITATIVE: CPT

## 2025-01-03 PROCEDURE — 81001 URINALYSIS AUTO W/SCOPE: CPT

## 2025-01-09 ENCOUNTER — OFFICE VISIT (OUTPATIENT)
Dept: CARDIOLOGY | Facility: HOSPITAL | Age: 85
End: 2025-01-09
Payer: COMMERCIAL

## 2025-01-09 VITALS
DIASTOLIC BLOOD PRESSURE: 60 MMHG | OXYGEN SATURATION: 96 % | SYSTOLIC BLOOD PRESSURE: 142 MMHG | BODY MASS INDEX: 20.88 KG/M2 | HEART RATE: 47 BPM | HEIGHT: 67 IN | WEIGHT: 133 LBS

## 2025-01-09 DIAGNOSIS — I44.1 MOBITZ (TYPE) I (WENCKEBACH'S) ATRIOVENTRICULAR BLOCK: ICD-10-CM

## 2025-01-09 DIAGNOSIS — I65.21 ASYMPTOMATIC STENOSIS OF RIGHT CAROTID ARTERY: ICD-10-CM

## 2025-01-09 DIAGNOSIS — R00.1 BRADYCARDIA: Primary | ICD-10-CM

## 2025-01-09 DIAGNOSIS — I10 HYPERTENSION, UNSPECIFIED TYPE: ICD-10-CM

## 2025-01-09 LAB
ATRIAL RATE: 38 BPM
Q ONSET: 225 MS
QRS COUNT: 6 BEATS
QRS DURATION: 106 MS
QT INTERVAL: 468 MS
QTC CALCULATION(BAZETT): 346 MS
QTC FREDERICIA: 383 MS
R AXIS: -23 DEGREES
T AXIS: 60 DEGREES
T OFFSET: 459 MS
VENTRICULAR RATE: 33 BPM

## 2025-01-09 PROCEDURE — 93005 ELECTROCARDIOGRAM TRACING: CPT | Performed by: NURSE PRACTITIONER

## 2025-01-09 PROCEDURE — 99214 OFFICE O/P EST MOD 30 MIN: CPT | Performed by: NURSE PRACTITIONER

## 2025-01-09 PROCEDURE — 1159F MED LIST DOCD IN RCRD: CPT | Performed by: NURSE PRACTITIONER

## 2025-01-09 PROCEDURE — 3077F SYST BP >= 140 MM HG: CPT | Performed by: NURSE PRACTITIONER

## 2025-01-09 PROCEDURE — 1036F TOBACCO NON-USER: CPT | Performed by: NURSE PRACTITIONER

## 2025-01-09 PROCEDURE — G2211 COMPLEX E/M VISIT ADD ON: HCPCS | Performed by: NURSE PRACTITIONER

## 2025-01-09 PROCEDURE — 3078F DIAST BP <80 MM HG: CPT | Performed by: NURSE PRACTITIONER

## 2025-01-09 PROCEDURE — 1123F ACP DISCUSS/DSCN MKR DOCD: CPT | Performed by: NURSE PRACTITIONER

## 2025-01-09 RX ORDER — LOSARTAN POTASSIUM 100 MG/1
50 TABLET ORAL DAILY
Start: 2025-01-09

## 2025-01-09 NOTE — PROGRESS NOTES
Primary Care Physician: Stanislav Rousseau MD  Date of Visit: 01/09/2025  8:40 AM EST  Location of visit: [facility]     Chief Complaint:   Chief Complaint   Patient presents with    Follow-up        HPI / Summary:   Martín Capellan is a 84 y.o. male presents for followup. Seen in collaboration with Dr. Jimenez. He has been walking 2 miles daily without chest pain or dyspnea.  The patient denies chest pain, shortness of breath, palpitations, lightheadedness, syncope, orthopnea, paroxysmal nocturnal dyspnea, lower extremity edema, or bleeding problems.     He does monitor his blood pressure at home and his blood pressures have been 132 to 136 over 65 to 70.     He stopped taking doxazosin and chlorthalidone secondary to fatigue and lower extremity edema and lightheadedness.                Past Medical History:  No past medical history on file.     Past Surgical History:  Past Surgical History:   Procedure Laterality Date    CORONARY ARTERY BYPASS GRAFT  04/26/2022    CABG          Social History:   reports that he has quit smoking. His smoking use included cigarettes. He has never used smokeless tobacco. He reports current alcohol use of about 2.0 standard drinks of alcohol per week. He reports that he does not currently use drugs.     Family History:  family history includes No Known Problems in his father and mother.      Allergies:  No Known Allergies    Outpatient Medications:  Current Outpatient Medications   Medication Instructions    aspirin 81 mg chewable tablet 1 tablet, Daily    cholecalciferol, vitamin D3, (VITAMIN D3 ORAL) 1 tablet, Daily RT    cyanocobalamin (VITAMIN B-12) 50 mcg, Daily    ezetimibe (ZETIA) 10 mg, oral, Nightly    Farxiga 10 mg 1 tablet, Daily (0630)    losartan (COZAAR) 100 mg, oral, Daily    magnesium oxide (Mag-Ox) 400 mg tablet 1 tablet, Daily    NON FORMULARY RED BEET SUPPLEMENT - TAKE 1 CHEWABLE GUMMY DAILY       Physical Exam:  Vitals:    01/09/25 0827   BP: 150/70   Pulse: (!)  "47   SpO2: 96%   Weight: 60.3 kg (133 lb)   Height: 1.702 m (5' 7\")     Wt Readings from Last 5 Encounters:   01/09/25 60.3 kg (133 lb)   10/18/24 62.1 kg (137 lb)   06/26/24 64 kg (141 lb)   02/01/24 64 kg (141 lb)   01/04/24 64 kg (141 lb)     Body mass index is 20.83 kg/m².   GENERAL: alert, cooperative, pleasant, in no acute distress  SKIN: warm and dry  NECK: Normal JVD, negative HJR  CARDIAC: Regular rate and rhythm with no rubs, murmurs, or gallops  CHEST: Normal respiratory efforts, lungs clear to auscultation bilaterally.  ABDOMEN: soft, nontender, nondistended  EXTREMITIES: no edema, +2 palpable RP and DP pulses bilaterally       Last Labs:  Recent Labs     01/03/25  0838 10/11/24  0839 07/11/24  0933   WBC 4.5 4.9 5.9   HGB 10.5* 10.2* 10.1*   HCT 33.0* 31.9* 31.4*    213 236   MCV 99 100 101*     Recent Labs     01/03/25  0838 10/11/24  0839 09/16/24  0850   * 138 140   K 4.9 4.9 5.2    105 109*   BUN 41* 39* 43*   CREATININE 2.11* 2.12* 2.28*     CMP -  Lab Results   Component Value Date    CALCIUM 9.6 01/03/2025    PHOS 4.0 01/03/2025    PROT 7.0 07/11/2024    ALBUMIN 4.5 01/03/2025    AST 19 06/26/2024    ALT 23 10/11/2024    ALKPHOS 56 05/21/2020    BILITOT 0.5 05/21/2020       LIPID PANEL -   Lab Results   Component Value Date    CHOL 158 10/11/2024    HDL 59.5 10/11/2024    LDLF 73 03/29/2023    TRIG 67 10/11/2024   LDL 99 10/11/24    Lab Results   Component Value Date    HGBA1C 5.6 10/11/2024       Last Cardiology Tests:  ECG:  Obtained and reviewed EKG- Sinus bradycardia HR 33with second degree AV block ( Mobitz 1)    Echo:  Echocardiogram March 25, 2017  CONCLUSIONS:   1. The left ventricular systolic function is hyperdynamic with a 65-70% estimated ejection fraction.   2. Spectral Doppler shows an impaired relaxation pattern of left ventricular diastolic filling.   3. The left atrium is upper limits of normal in size.     Cath:  Cardiac catheterization March 24, " 2017  Coronary Lesion Summary:  Vessel         Stenosis   Vessel Segment  Left Main    90% stenosis     distal  LAD          80% stenosis     ostial  2nd Diagonal 80% stenosis     ostial  Circumflex   80% stenosis     ostial  Circumflex   40% stenosis      mid  Ramus        95% stenosis     ostial  CONCLUSIONS:   1. Severe CAD involving distal LM, ostial LAD, LCX, ramus and diagonal.   2. Elevated LVEDP.   3. No aortic stenosis.     Stress Test:  Stress Results:  No results found for this or any previous visit from the past 365 days.           Cardiac Imaging:  Carotid ultrasound March 23, 2023  Right Carotid: Findings are consistent with greater than 70% stenosis of the right proximal ICA. Turbulent flow seen by color Doppler. There is a >50% stenosis noted in the right external carotid artery. No evidence of hemodynamically significant stenosis of the right common carotid artery. Abnormal pre steal waveforms are visualized in the right vertebral artery suggestive of proximal stenosis. There is a >50% stenosis noted in the right subclavian artery.     Left Carotid: Findings are consistent with 50 to 69% stenosis of the left proximal ICA. Turbulent flow seen by color Doppler. Unable to obtain a doppler signal in the left ECA that is suggestive of occlusion. No evidence of hemodynamically significant stenosis of the left common carotid artery. The left vertebral artery is patent with antegrade flow. No evidence of hemodynamically significant stenosis in the left subclavian artery.    Carotid ultrasound 6/26/24  CONCLUSIONS:  Right Carotid: Findings are consistent with greater than 70% stenosis of the right proximal internal carotid artery. Turbulent flow seen by color Doppler. Right ICA proximal segment degree of stenosis may be underestimated due to calcified shadowing plaque. There are elevated velocities in the right ECA that are suggestive of disease. There is a >50% stenosis noted in the right external carotid  artery. No evidence of hemodynamically significant stenosis of the right common carotid artery. The right vertebral artery Known abnormal pre steal waveforms are noted in the right vertebral artery suggestive of proximal stenosis. There are elevated velocities in the right subclavian artery that are suggestive of disease. There is a >50% stenosis noted in the right subclavian artery.  Left Carotid: Findings are consistent with less than 50% stenosis of the left proximal internal carotid artery. The left proximal internal carotid artery is normal. Left ICA proximal segment degree of stenosis may be underestimated due to calcified shadowing plaque. Unable to obtain a doppler signal in the left ECA that is suggestive of occlusion. No evidence of hemodynamically significant stenosis of the left common carotid artery. The left vertebral artery is patent with antegrade flow. There are elevated velocities in the left subclavian artery that are suggestive of disease. There is a >50% stenosis noted in the left subclavian artery.     Comparison:  Compared with study from 3/23/2023, On todays exam the left subclavian artery is noted with a >50% stenosis noted at the proximal segment of the vessel and the left ICA proximal is noted with <50% stenosis.       Assessment/Plan   Problem List Items Addressed This Visit          Cardiac and Vasculature    Hypertension    Relevant Medications    losartan (Cozaar) 100 mg tablet    Mobitz (type) I (Wenckebach's) atrioventricular block    Relevant Orders    ECG 12 lead (Clinic Performed)    Bradycardia - Primary    Relevant Orders    ECG 12 lead (Clinic Performed)    Carotid stenosis, asymptomatic    Relevant Orders    Vascular US carotid artery duplex bilateral     In summary Mr. Capellan is a pleasant 84 year-old male with a past medical history significant for coronary artery disease status post CABG with preserved LV function, hypertension, hyperlipidemia with intolerance to multiple  statins and Repatha, moderate bilateral carotid artery disease, peripheral arterial disease with abnormal MEY on the left, asymptomatic Mobitz one second degree AV block and chronic kidney disease.  He is asymptomatic from a cardiac perspective.  He is euvolemic on exam.  He was seen by vascular surgery and declined intervention for carotid stenosis.  His blood pressure today is elevated.  His home readings are better. I did not make any changes. His nephrologist recently decreased dose of Losartan to 50 mg once a day. His last lipid profile was not at goal.  I did order labs as indicated below.  He declined any further medications for his cholesterol.  He should continue his current cardiovascular medications.  We will see him back in follow-up in 6 months with a carotid ultrasound.          Orders:  No orders of the defined types were placed in this encounter.     Followup Appts:  No future appointments.        ____________________________________________________________  Liliana Altamirano, APRN-CNP  [facility]  [facility]

## 2025-01-09 NOTE — PATIENT INSTRUCTIONS
Continue current meds  Follow up in 6 month with carotid ultrasound  Continue physical activity  Continue monitoring blood pressure

## 2025-02-03 ENCOUNTER — TELEPHONE (OUTPATIENT)
Dept: PRIMARY CARE | Facility: CLINIC | Age: 85
End: 2025-02-03
Payer: COMMERCIAL

## 2025-02-03 ENCOUNTER — OFFICE VISIT (OUTPATIENT)
Dept: PRIMARY CARE | Facility: CLINIC | Age: 85
End: 2025-02-03
Payer: COMMERCIAL

## 2025-02-03 VITALS
WEIGHT: 132 LBS | TEMPERATURE: 97.6 F | DIASTOLIC BLOOD PRESSURE: 80 MMHG | OXYGEN SATURATION: 99 % | HEIGHT: 67 IN | BODY MASS INDEX: 20.72 KG/M2 | HEART RATE: 78 BPM | SYSTOLIC BLOOD PRESSURE: 156 MMHG

## 2025-02-03 DIAGNOSIS — R21 RASH: Primary | ICD-10-CM

## 2025-02-03 DIAGNOSIS — I10 HYPERTENSION, UNSPECIFIED TYPE: ICD-10-CM

## 2025-02-03 PROCEDURE — 1036F TOBACCO NON-USER: CPT | Performed by: INTERNAL MEDICINE

## 2025-02-03 PROCEDURE — 3079F DIAST BP 80-89 MM HG: CPT | Performed by: INTERNAL MEDICINE

## 2025-02-03 PROCEDURE — 1159F MED LIST DOCD IN RCRD: CPT | Performed by: INTERNAL MEDICINE

## 2025-02-03 PROCEDURE — 3077F SYST BP >= 140 MM HG: CPT | Performed by: INTERNAL MEDICINE

## 2025-02-03 PROCEDURE — 99213 OFFICE O/P EST LOW 20 MIN: CPT | Performed by: INTERNAL MEDICINE

## 2025-02-03 PROCEDURE — 1123F ACP DISCUSS/DSCN MKR DOCD: CPT | Performed by: INTERNAL MEDICINE

## 2025-02-03 RX ORDER — PREDNISONE 20 MG/1
20 TABLET ORAL DAILY
Qty: 10 TABLET | Refills: 0 | Status: SHIPPED | OUTPATIENT
Start: 2025-02-03 | End: 2025-02-13

## 2025-02-03 NOTE — PROGRESS NOTES
Subjective   Patient ID: Martín Capellan is a 84 y.o. male who presents for Rash.    HPI    Review of Systems    Previous history  No past medical history on file.  Past Surgical History:   Procedure Laterality Date    CORONARY ARTERY BYPASS GRAFT  04/26/2022    CABG     Social History     Tobacco Use    Smoking status: Former     Types: Cigarettes    Smokeless tobacco: Never   Substance Use Topics    Alcohol use: Yes     Alcohol/week: 2.0 standard drinks of alcohol     Types: 2 Standard drinks or equivalent per week     Comment: socially    Drug use: Not Currently     Family History   Problem Relation Name Age of Onset    No Known Problems Mother      No Known Problems Father       No Known Allergies  Current Outpatient Medications   Medication Instructions    aspirin 81 mg chewable tablet 1 tablet, Daily    cholecalciferol, vitamin D3, (VITAMIN D3 ORAL) 1 tablet, Daily RT    ezetimibe (ZETIA) 10 mg, oral, Nightly    losartan (COZAAR) 50 mg, oral, Daily       Objective       Physical Exam      Assessment/Plan   Martín Capellan is a 84 y.o. male who presents for the concerns below:    Problem List Items Addressed This Visit       Hypertension            Discussed with:   Return in :    Portions of this note were generated using digital voice recognition software, and may contain grammatical errors       Stanislav Rousseau MD  02/03/25  11:01 AM

## 2025-02-03 NOTE — TELEPHONE ENCOUNTER
Patient called in asked to be seen today, offered the opening at 10:15 am, stated he could not make it until after 11:00 am, informed him we do not have that time available, asked for message to be sent to provider, Also offered virtual visit at 12:30 pm patient refused, very painful rash all over, Advise?

## 2025-02-17 ENCOUNTER — APPOINTMENT (OUTPATIENT)
Dept: PRIMARY CARE | Facility: CLINIC | Age: 85
End: 2025-02-17
Payer: COMMERCIAL

## 2025-02-17 VITALS — TEMPERATURE: 95.7 F | HEIGHT: 67 IN | WEIGHT: 133 LBS | BODY MASS INDEX: 20.88 KG/M2

## 2025-02-17 DIAGNOSIS — D64.9 ANEMIA, UNSPECIFIED TYPE: Primary | ICD-10-CM

## 2025-02-17 DIAGNOSIS — N18.31 STAGE 3A CHRONIC KIDNEY DISEASE (MULTI): ICD-10-CM

## 2025-02-17 DIAGNOSIS — E53.8 VITAMIN B 12 DEFICIENCY: ICD-10-CM

## 2025-02-17 PROCEDURE — 99213 OFFICE O/P EST LOW 20 MIN: CPT | Performed by: INTERNAL MEDICINE

## 2025-02-17 PROCEDURE — 1123F ACP DISCUSS/DSCN MKR DOCD: CPT | Performed by: INTERNAL MEDICINE

## 2025-02-17 PROCEDURE — 1036F TOBACCO NON-USER: CPT | Performed by: INTERNAL MEDICINE

## 2025-02-17 NOTE — PROGRESS NOTES
Subjective   Patient ID: Martín Capellan is a 84 y.o. male who presents for No chief complaint on file..    HPI  In for BP check and anemia and rash follow up, RASH is better, Still somewhat   Review of Systems    Previous history  No past medical history on file.  Past Surgical History:   Procedure Laterality Date    CORONARY ARTERY BYPASS GRAFT  04/26/2022    CABG     Social History     Tobacco Use    Smoking status: Former     Types: Cigarettes    Smokeless tobacco: Never   Substance Use Topics    Alcohol use: Yes     Alcohol/week: 2.0 standard drinks of alcohol     Types: 2 Standard drinks or equivalent per week     Comment: socially    Drug use: Not Currently     Family History   Problem Relation Name Age of Onset    No Known Problems Mother      No Known Problems Father       No Known Allergies  Current Outpatient Medications   Medication Instructions    aspirin 81 mg chewable tablet 1 tablet, Daily    cholecalciferol, vitamin D3, (VITAMIN D3 ORAL) 1 tablet, Daily RT    ezetimibe (ZETIA) 10 mg, oral, Nightly    losartan (COZAAR) 50 mg, oral, Daily       Objective       Physical Exam      Assessment/Plan   Martín Capellan is a 84 y.o. male who presents for the concerns below:    Problem List Items Addressed This Visit       Chronic kidney disease    Relevant Orders    Comprehensive metabolic panel     Other Visit Diagnoses       Anemia, unspecified type    -  Primary    Relevant Orders    CBC and Auto Differential    Vitamin B 12 deficiency        Relevant Orders    Vitamin B12          Will recheck labs. Call if no better.        Discussed with:   Return in :    Portions of this note were generated using digital voice recognition software, and may contain grammatical errors       Stanislav Rousseau MD  02/17/25  9:49 AM

## 2025-03-01 LAB
ALBUMIN SERPL-MCNC: 4.5 G/DL (ref 3.6–5.1)
ALP SERPL-CCNC: 79 U/L (ref 35–144)
ALT SERPL-CCNC: 22 U/L (ref 9–46)
ANION GAP SERPL CALCULATED.4IONS-SCNC: 10 MMOL/L (CALC) (ref 7–17)
AST SERPL-CCNC: 23 U/L (ref 10–35)
BASOPHILS # BLD AUTO: 30 CELLS/UL (ref 0–200)
BASOPHILS NFR BLD AUTO: 0.5 %
BILIRUB SERPL-MCNC: 0.9 MG/DL (ref 0.2–1.2)
BUN SERPL-MCNC: 38 MG/DL (ref 7–25)
CALCIUM SERPL-MCNC: 9.1 MG/DL (ref 8.6–10.3)
CHLORIDE SERPL-SCNC: 106 MMOL/L (ref 98–110)
CO2 SERPL-SCNC: 22 MMOL/L (ref 20–32)
CREAT SERPL-MCNC: 2.16 MG/DL (ref 0.7–1.22)
EGFRCR SERPLBLD CKD-EPI 2021: 30 ML/MIN/1.73M2
EOSINOPHIL # BLD AUTO: 282 CELLS/UL (ref 15–500)
EOSINOPHIL NFR BLD AUTO: 4.7 %
ERYTHROCYTE [DISTWIDTH] IN BLOOD BY AUTOMATED COUNT: 13.9 % (ref 11–15)
GLUCOSE SERPL-MCNC: 112 MG/DL (ref 65–99)
HCT VFR BLD AUTO: 33.7 % (ref 38.5–50)
HGB BLD-MCNC: 11 G/DL (ref 13.2–17.1)
LYMPHOCYTES # BLD AUTO: 1374 CELLS/UL (ref 850–3900)
LYMPHOCYTES NFR BLD AUTO: 22.9 %
MCH RBC QN AUTO: 31.8 PG (ref 27–33)
MCHC RBC AUTO-ENTMCNC: 32.6 G/DL (ref 32–36)
MCV RBC AUTO: 97.4 FL (ref 80–100)
MONOCYTES # BLD AUTO: 624 CELLS/UL (ref 200–950)
MONOCYTES NFR BLD AUTO: 10.4 %
NEUTROPHILS # BLD AUTO: 3690 CELLS/UL (ref 1500–7800)
NEUTROPHILS NFR BLD AUTO: 61.5 %
PLATELET # BLD AUTO: 240 THOUSAND/UL (ref 140–400)
PMV BLD REES-ECKER: 10.3 FL (ref 7.5–12.5)
POTASSIUM SERPL-SCNC: 4.7 MMOL/L (ref 3.5–5.3)
PROT SERPL-MCNC: 6.8 G/DL (ref 6.1–8.1)
RBC # BLD AUTO: 3.46 MILLION/UL (ref 4.2–5.8)
SODIUM SERPL-SCNC: 138 MMOL/L (ref 135–146)
VIT B12 SERPL-MCNC: 559 PG/ML (ref 200–1100)
WBC # BLD AUTO: 6 THOUSAND/UL (ref 3.8–10.8)

## 2025-03-16 DIAGNOSIS — I10 HYPERTENSION, UNSPECIFIED TYPE: ICD-10-CM

## 2025-03-16 DIAGNOSIS — E78.00 PURE HYPERCHOLESTEROLEMIA: ICD-10-CM

## 2025-03-17 RX ORDER — EZETIMIBE 10 MG/1
10 TABLET ORAL NIGHTLY
Qty: 90 TABLET | Refills: 3 | Status: SHIPPED | OUTPATIENT
Start: 2025-03-17 | End: 2026-03-17

## 2025-03-17 RX ORDER — LOSARTAN POTASSIUM 100 MG/1
100 TABLET ORAL DAILY
Qty: 90 TABLET | Refills: 3 | Status: SHIPPED | OUTPATIENT
Start: 2025-03-17 | End: 2026-03-17

## 2025-05-19 ENCOUNTER — APPOINTMENT (OUTPATIENT)
Dept: NEPHROLOGY | Facility: CLINIC | Age: 85
End: 2025-05-19
Payer: COMMERCIAL

## 2025-05-19 VITALS — DIASTOLIC BLOOD PRESSURE: 61 MMHG | HEART RATE: 47 BPM | SYSTOLIC BLOOD PRESSURE: 196 MMHG | OXYGEN SATURATION: 97 %

## 2025-05-19 DIAGNOSIS — I25.10 CORONARY ARTERY DISEASE INVOLVING NATIVE CORONARY ARTERY OF NATIVE HEART WITHOUT ANGINA PECTORIS: ICD-10-CM

## 2025-05-19 DIAGNOSIS — I10 HYPERTENSION, UNSPECIFIED TYPE: ICD-10-CM

## 2025-05-19 DIAGNOSIS — D33.4 SCHWANNOMA OF SPINAL CORD (MULTI): ICD-10-CM

## 2025-05-19 DIAGNOSIS — N18.4 CHRONIC KIDNEY DISEASE, STAGE 4 (SEVERE) (MULTI): Primary | ICD-10-CM

## 2025-05-19 DIAGNOSIS — R80.9 PROTEINURIA, UNSPECIFIED TYPE: ICD-10-CM

## 2025-05-19 RX ORDER — HYDROCHLOROTHIAZIDE 25 MG/1
25 TABLET ORAL DAILY
Qty: 30 TABLET | Refills: 11 | Status: SHIPPED | OUTPATIENT
Start: 2025-05-19 | End: 2026-05-19

## 2025-05-19 RX ORDER — HYDROCHLOROTHIAZIDE 25 MG/1
25 TABLET ORAL DAILY
COMMUNITY
End: 2025-05-19 | Stop reason: SDUPTHER

## 2025-05-19 RX ORDER — LOSARTAN POTASSIUM 100 MG/1
50 TABLET ORAL DAILY
Qty: 45 TABLET | Refills: 3 | Status: SHIPPED | OUTPATIENT
Start: 2025-05-19 | End: 2026-05-19

## 2025-05-19 RX ORDER — DAPAGLIFLOZIN 10 MG/1
10 TABLET, FILM COATED ORAL EVERY 24 HOURS
COMMUNITY

## 2025-05-19 ASSESSMENT — PAIN SCALES - GENERAL: PAINLEVEL_OUTOF10: 0-NO PAIN

## 2025-05-19 NOTE — PROGRESS NOTES
Subjective   Martín Capellan is a 84 y.o. male who presented today to discuss his stage 4 chronic kidney disease of unclear etiology.  He did have a bypass in 2017, may have the microvascular disease accompanying his comorbidities.  He has hypertension, hyperlipidemia, gout, reflux, had an appendectomy at 9 years old.  We had done genetic testing which was unrevealing.  He denies nausea, vomiting, chest pain, shortness of breath, or abdominal pain.    His blood pressures are often in the 140s systolic at home and I am not pleased.    ROS  As in Subjective, all other ROS are negative    Objective     Vital signs    Visit Vitals  BP (!) 196/61 (BP Location: Left arm, Patient Position: Sitting, BP Cuff Size: Adult) Comment: requested a retake   Pulse (!) 47      There were no vitals filed for this visit.     Physical Exam  Constitutional:       Appearance: Normal appearance.   HENT:      Mouth/Throat:      Mouth: Mucous membranes are moist.   Eyes:      Extraocular Movements: Extraocular movements intact.      Pupils: Pupils are equal, round, and reactive to light.   Cardiovascular:      Rate and Rhythm: Regular rhythm.      Heart sounds: S1 normal and S2 normal.   Pulmonary:      Breath sounds: Normal breath sounds.   Abdominal:      Comments: Soft, NT/ND, no masses, normal bowel sounds   Genitourinary:     Comments: No alston  Musculoskeletal:      No synovitis  Edema:     Right lower leg: No edema.      Left lower leg: No edema.   Skin:     General: Skin is warm and dry.   Neurological:      General: No focal deficit present.      Mental Status: She is alert and oriented to person, place, and time.   Psychiatric:         Behavior: Behavior normal.      Meds  Current Medications[1]     Allergies  RX Allergies[2]     Results  Lab Results   Component Value Date    GLUCOSE 112 (H) 02/28/2025     02/28/2025    K 4.7 02/28/2025     02/28/2025    CO2 22 02/28/2025    ANIONGAP 10 02/28/2025    BUN 38 (H)  "02/28/2025    CREATININE 2.16 (H) 02/28/2025    CALCIUM 9.1 02/28/2025     Lab Results   Component Value Date    PTH 97.3 (H) 01/03/2025    CALCIUM 9.1 02/28/2025     No results found for: \"ALBUR\", \"[id number]\"         @LABALLVALUEIP(CREATININE:*)@  @LABALLVALUEIP(NA:*)@    Imaging results  === 11/09/22 ===    US RENAL COMPLETE    - Impression -  1. Small left kidney with increased echogenicity of the cortex of  both kidneys consistent with medical renal disease.  2. Subcentimeter simple left renal cortical cyst.  3. Enlarged prostate gland.     Assessment and Plan  Martín Capellan has longstanding stage 3b/4 chronic kidney disease of somewhat unclear etiology.  He has protein in the urine, I had asked him to do a kidney biopsy but he is essentially refused.  Within the last year I convinced him to take a SGLT2 inhibitor which she has tolerated, no urinary tract infections.  He did labs, they are stuck in the [employer] world somewhere, I am seeking the results.  Any labs going forward, that I placed in the system, will make their way to my chart.  When checked on February 28 creatinine stable at 2.16 mg/dL, no acidosis, electrolytes look good.  Hemoglobin 11 g/dL with normal platelets.  I reviewed his prior renal ultrasound, no findings that would warrant a repeat study.  He still has albumin in the urine.  I will add hydrochlorothiazide 25 mg daily.  I am hoping to bring the blood pressure down, I am also hoping that it will help bring the protein in the urine down.  His LDL cholesterol has typically been at goal, he is on ezetimibe, he is not on a statin.       Problem List Items Addressed This Visit       CAD (coronary artery disease)    Relevant Medications    hydroCHLOROthiazide (HYDRODiuril) 25 mg tablet    Other Relevant Orders    Albumin-Creatinine Ratio, Urine Random    Creatinine, Urine Random    Protein, Urine Random    Renal Function Panel    Hypertension    Relevant Medications    losartan (Cozaar) 100 mg " tablet    hydroCHLOROthiazide (HYDRODiuril) 25 mg tablet    Other Relevant Orders    Albumin-Creatinine Ratio, Urine Random    Creatinine, Urine Random    Protein, Urine Random    Renal Function Panel    Chronic kidney disease, stage 4 (severe) (Multi) - Primary    Relevant Orders    Follow Up In Nephrology     Other Visit Diagnoses         Proteinuria, unspecified type        Relevant Medications    hydroCHLOROthiazide (HYDRODiuril) 25 mg tablet    Other Relevant Orders    Albumin-Creatinine Ratio, Urine Random    Creatinine, Urine Random    Protein, Urine Random    Renal Function Panel           Peter Aldridge MD           [1]   Current Outpatient Medications:     aspirin 81 mg chewable tablet, Chew 1 tablet (81 mg) once daily., Disp: , Rfl:     cholecalciferol, vitamin D3, (VITAMIN D3 ORAL), Take 1 tablet by mouth once daily., Disp: , Rfl:     dapagliflozin propanediol (Farxiga) 10 mg tablet, Take 1 tablet (10 mg) by mouth once every 24 hours., Disp: , Rfl:     ezetimibe (Zetia) 10 mg tablet, Take 1 tablet (10 mg) by mouth once daily at bedtime., Disp: 90 tablet, Rfl: 3    hydroCHLOROthiazide (HYDRODiuril) 25 mg tablet, Take 1 tablet (25 mg) by mouth once daily., Disp: 30 tablet, Rfl: 11    losartan (Cozaar) 100 mg tablet, Take 0.5 tablets (50 mg) by mouth once daily., Disp: 45 tablet, Rfl: 3  [2] No Known Allergies

## 2025-06-04 ENCOUNTER — TELEPHONE (OUTPATIENT)
Dept: NEPHROLOGY | Facility: CLINIC | Age: 85
End: 2025-06-04
Payer: COMMERCIAL

## 2025-06-07 ENCOUNTER — APPOINTMENT (OUTPATIENT)
Dept: RADIOLOGY | Facility: HOSPITAL | Age: 85
DRG: 641 | End: 2025-06-07
Payer: MEDICARE

## 2025-06-07 ENCOUNTER — APPOINTMENT (OUTPATIENT)
Dept: CARDIOLOGY | Facility: HOSPITAL | Age: 85
DRG: 641 | End: 2025-06-07
Payer: MEDICARE

## 2025-06-07 ENCOUNTER — HOSPITAL ENCOUNTER (INPATIENT)
Facility: HOSPITAL | Age: 85
LOS: 1 days | Discharge: HOME | DRG: 641 | End: 2025-06-08
Attending: EMERGENCY MEDICINE | Admitting: FAMILY MEDICINE
Payer: MEDICARE

## 2025-06-07 DIAGNOSIS — E87.1 HYPONATREMIA: Primary | ICD-10-CM

## 2025-06-07 DIAGNOSIS — N17.9 AKI (ACUTE KIDNEY INJURY): ICD-10-CM

## 2025-06-07 DIAGNOSIS — R00.1 BRADYCARDIA: ICD-10-CM

## 2025-06-07 DIAGNOSIS — R80.9 PROTEINURIA, UNSPECIFIED TYPE: ICD-10-CM

## 2025-06-07 DIAGNOSIS — I25.10 CORONARY ARTERY DISEASE INVOLVING NATIVE CORONARY ARTERY OF NATIVE HEART WITHOUT ANGINA PECTORIS: ICD-10-CM

## 2025-06-07 DIAGNOSIS — R42 DIZZINESS: ICD-10-CM

## 2025-06-07 DIAGNOSIS — I10 HYPERTENSION, UNSPECIFIED TYPE: ICD-10-CM

## 2025-06-07 LAB
ALBUMIN SERPL BCP-MCNC: 4.1 G/DL (ref 3.4–5)
ALP SERPL-CCNC: 60 U/L (ref 33–136)
ALT SERPL W P-5'-P-CCNC: 18 U/L (ref 10–52)
ANION GAP SERPL CALC-SCNC: 15 MMOL/L (ref 10–20)
AST SERPL W P-5'-P-CCNC: 24 U/L (ref 9–39)
BASOPHILS # BLD AUTO: 0.02 X10*3/UL (ref 0–0.1)
BASOPHILS NFR BLD AUTO: 0.3 %
BILIRUB SERPL-MCNC: 0.6 MG/DL (ref 0–1.2)
BUN SERPL-MCNC: 43 MG/DL (ref 6–23)
CALCIUM SERPL-MCNC: 8.3 MG/DL (ref 8.6–10.3)
CARDIAC TROPONIN I PNL SERPL HS: 17 NG/L (ref 0–20)
CARDIAC TROPONIN I PNL SERPL HS: 19 NG/L (ref 0–20)
CHLORIDE SERPL-SCNC: 91 MMOL/L (ref 98–107)
CO2 SERPL-SCNC: 21 MMOL/L (ref 21–32)
CREAT SERPL-MCNC: 2.41 MG/DL (ref 0.5–1.3)
EGFRCR SERPLBLD CKD-EPI 2021: 26 ML/MIN/1.73M*2
EOSINOPHIL # BLD AUTO: 0.13 X10*3/UL (ref 0–0.4)
EOSINOPHIL NFR BLD AUTO: 1.9 %
ERYTHROCYTE [DISTWIDTH] IN BLOOD BY AUTOMATED COUNT: 13.8 % (ref 11.5–14.5)
GLUCOSE SERPL-MCNC: 84 MG/DL (ref 74–99)
HCT VFR BLD AUTO: 28.6 % (ref 41–52)
HGB BLD-MCNC: 9.8 G/DL (ref 13.5–17.5)
IMM GRANULOCYTES # BLD AUTO: 0.03 X10*3/UL (ref 0–0.5)
IMM GRANULOCYTES NFR BLD AUTO: 0.4 % (ref 0–0.9)
LYMPHOCYTES # BLD AUTO: 0.85 X10*3/UL (ref 0.8–3)
LYMPHOCYTES NFR BLD AUTO: 12.6 %
MAGNESIUM SERPL-MCNC: 2.39 MG/DL (ref 1.6–2.4)
MCH RBC QN AUTO: 32.9 PG (ref 26–34)
MCHC RBC AUTO-ENTMCNC: 34.3 G/DL (ref 32–36)
MCV RBC AUTO: 96 FL (ref 80–100)
MONOCYTES # BLD AUTO: 0.61 X10*3/UL (ref 0.05–0.8)
MONOCYTES NFR BLD AUTO: 9 %
NEUTROPHILS # BLD AUTO: 5.13 X10*3/UL (ref 1.6–5.5)
NEUTROPHILS NFR BLD AUTO: 75.8 %
NRBC BLD-RTO: 0 /100 WBCS (ref 0–0)
PLATELET # BLD AUTO: 227 X10*3/UL (ref 150–450)
POTASSIUM SERPL-SCNC: 4.6 MMOL/L (ref 3.5–5.3)
PROT SERPL-MCNC: 6 G/DL (ref 6.4–8.2)
RBC # BLD AUTO: 2.98 X10*6/UL (ref 4.5–5.9)
SODIUM SERPL-SCNC: 122 MMOL/L (ref 136–145)
WBC # BLD AUTO: 6.8 X10*3/UL (ref 4.4–11.3)

## 2025-06-07 PROCEDURE — 99285 EMERGENCY DEPT VISIT HI MDM: CPT | Mod: 25 | Performed by: EMERGENCY MEDICINE

## 2025-06-07 PROCEDURE — 36415 COLL VENOUS BLD VENIPUNCTURE: CPT | Performed by: EMERGENCY MEDICINE

## 2025-06-07 PROCEDURE — 73564 X-RAY EXAM KNEE 4 OR MORE: CPT | Mod: RIGHT SIDE | Performed by: RADIOLOGY

## 2025-06-07 PROCEDURE — 72125 CT NECK SPINE W/O DYE: CPT | Performed by: RADIOLOGY

## 2025-06-07 PROCEDURE — 80053 COMPREHEN METABOLIC PANEL: CPT | Performed by: EMERGENCY MEDICINE

## 2025-06-07 PROCEDURE — 2500000004 HC RX 250 GENERAL PHARMACY W/ HCPCS (ALT 636 FOR OP/ED): Performed by: EMERGENCY MEDICINE

## 2025-06-07 PROCEDURE — 2500000001 HC RX 250 WO HCPCS SELF ADMINISTERED DRUGS (ALT 637 FOR MEDICARE OP): Performed by: FAMILY MEDICINE

## 2025-06-07 PROCEDURE — 2500000002 HC RX 250 W HCPCS SELF ADMINISTERED DRUGS (ALT 637 FOR MEDICARE OP, ALT 636 FOR OP/ED): Performed by: FAMILY MEDICINE

## 2025-06-07 PROCEDURE — 73564 X-RAY EXAM KNEE 4 OR MORE: CPT | Mod: RT

## 2025-06-07 PROCEDURE — 2500000002 HC RX 250 W HCPCS SELF ADMINISTERED DRUGS (ALT 637 FOR MEDICARE OP, ALT 636 FOR OP/ED): Performed by: EMERGENCY MEDICINE

## 2025-06-07 PROCEDURE — 70450 CT HEAD/BRAIN W/O DYE: CPT | Performed by: RADIOLOGY

## 2025-06-07 PROCEDURE — 83735 ASSAY OF MAGNESIUM: CPT | Performed by: EMERGENCY MEDICINE

## 2025-06-07 PROCEDURE — 72125 CT NECK SPINE W/O DYE: CPT

## 2025-06-07 PROCEDURE — 85025 COMPLETE CBC W/AUTO DIFF WBC: CPT | Performed by: EMERGENCY MEDICINE

## 2025-06-07 PROCEDURE — 93005 ELECTROCARDIOGRAM TRACING: CPT

## 2025-06-07 PROCEDURE — 96374 THER/PROPH/DIAG INJ IV PUSH: CPT

## 2025-06-07 PROCEDURE — 84484 ASSAY OF TROPONIN QUANT: CPT | Performed by: EMERGENCY MEDICINE

## 2025-06-07 PROCEDURE — 1200000002 HC GENERAL ROOM WITH TELEMETRY DAILY

## 2025-06-07 PROCEDURE — 70450 CT HEAD/BRAIN W/O DYE: CPT

## 2025-06-07 PROCEDURE — 2500000004 HC RX 250 GENERAL PHARMACY W/ HCPCS (ALT 636 FOR OP/ED): Performed by: FAMILY MEDICINE

## 2025-06-07 RX ORDER — ACETAMINOPHEN 160 MG/5ML
650 SOLUTION ORAL EVERY 4 HOURS PRN
Status: DISCONTINUED | OUTPATIENT
Start: 2025-06-07 | End: 2025-06-08 | Stop reason: HOSPADM

## 2025-06-07 RX ORDER — ACETAMINOPHEN 650 MG/1
650 SUPPOSITORY RECTAL EVERY 4 HOURS PRN
Status: DISCONTINUED | OUTPATIENT
Start: 2025-06-07 | End: 2025-06-08 | Stop reason: HOSPADM

## 2025-06-07 RX ORDER — ACETAMINOPHEN 325 MG/1
650 TABLET ORAL EVERY 4 HOURS PRN
Status: DISCONTINUED | OUTPATIENT
Start: 2025-06-07 | End: 2025-06-08 | Stop reason: HOSPADM

## 2025-06-07 RX ORDER — EZETIMIBE 10 MG/1
10 TABLET ORAL NIGHTLY
Status: DISCONTINUED | OUTPATIENT
Start: 2025-06-07 | End: 2025-06-08 | Stop reason: HOSPADM

## 2025-06-07 RX ORDER — GUAIFENESIN 600 MG/1
600 TABLET, EXTENDED RELEASE ORAL EVERY 12 HOURS PRN
Status: DISCONTINUED | OUTPATIENT
Start: 2025-06-07 | End: 2025-06-08 | Stop reason: HOSPADM

## 2025-06-07 RX ORDER — SODIUM CHLORIDE 9 MG/ML
100 INJECTION, SOLUTION INTRAVENOUS CONTINUOUS
Status: DISCONTINUED | OUTPATIENT
Start: 2025-06-07 | End: 2025-06-08 | Stop reason: HOSPADM

## 2025-06-07 RX ORDER — ONDANSETRON 4 MG/1
4 TABLET, FILM COATED ORAL EVERY 8 HOURS PRN
Status: DISCONTINUED | OUTPATIENT
Start: 2025-06-07 | End: 2025-06-08 | Stop reason: HOSPADM

## 2025-06-07 RX ORDER — HEPARIN SODIUM 5000 [USP'U]/ML
5000 INJECTION, SOLUTION INTRAVENOUS; SUBCUTANEOUS EVERY 8 HOURS SCHEDULED
Status: DISCONTINUED | OUTPATIENT
Start: 2025-06-07 | End: 2025-06-08 | Stop reason: HOSPADM

## 2025-06-07 RX ORDER — ONDANSETRON HYDROCHLORIDE 2 MG/ML
4 INJECTION, SOLUTION INTRAVENOUS EVERY 8 HOURS PRN
Status: DISCONTINUED | OUTPATIENT
Start: 2025-06-07 | End: 2025-06-08 | Stop reason: HOSPADM

## 2025-06-07 RX ORDER — HYDRALAZINE HYDROCHLORIDE 20 MG/ML
10 INJECTION INTRAMUSCULAR; INTRAVENOUS ONCE
Status: COMPLETED | OUTPATIENT
Start: 2025-06-07 | End: 2025-06-07

## 2025-06-07 RX ORDER — FAMOTIDINE 10 MG/ML
20 INJECTION, SOLUTION INTRAVENOUS DAILY
Status: DISCONTINUED | OUTPATIENT
Start: 2025-06-08 | End: 2025-06-08 | Stop reason: HOSPADM

## 2025-06-07 RX ORDER — NAPROXEN SODIUM 220 MG/1
81 TABLET, FILM COATED ORAL DAILY
Status: DISCONTINUED | OUTPATIENT
Start: 2025-06-08 | End: 2025-06-08 | Stop reason: HOSPADM

## 2025-06-07 RX ORDER — FAMOTIDINE 20 MG/1
20 TABLET, FILM COATED ORAL DAILY
Status: DISCONTINUED | OUTPATIENT
Start: 2025-06-08 | End: 2025-06-08 | Stop reason: HOSPADM

## 2025-06-07 RX ORDER — MECLIZINE HYDROCHLORIDE 25 MG/1
25 TABLET ORAL ONCE
Status: COMPLETED | OUTPATIENT
Start: 2025-06-07 | End: 2025-06-07

## 2025-06-07 RX ADMIN — MECLIZINE HYDROCHLORIDE 25 MG: 25 TABLET ORAL at 15:27

## 2025-06-07 RX ADMIN — SODIUM CHLORIDE 100 ML/HR: 0.9 INJECTION, SOLUTION INTRAVENOUS at 21:26

## 2025-06-07 RX ADMIN — HYDRALAZINE HYDROCHLORIDE 10 MG: 20 INJECTION INTRAMUSCULAR; INTRAVENOUS at 15:35

## 2025-06-07 RX ADMIN — EZETIMIBE 10 MG: 10 TABLET ORAL at 23:04

## 2025-06-07 RX ADMIN — ACETAMINOPHEN 650 MG: 325 TABLET, FILM COATED ORAL at 23:08

## 2025-06-07 RX ADMIN — HEPARIN SODIUM 5000 UNITS: 5000 INJECTION, SOLUTION INTRAVENOUS; SUBCUTANEOUS at 23:04

## 2025-06-07 SDOH — HEALTH STABILITY: PHYSICAL HEALTH: ON AVERAGE, HOW MANY MINUTES DO YOU ENGAGE IN EXERCISE AT THIS LEVEL?: 0 MIN

## 2025-06-07 SDOH — HEALTH STABILITY: PHYSICAL HEALTH: ON AVERAGE, HOW MANY DAYS PER WEEK DO YOU ENGAGE IN MODERATE TO STRENUOUS EXERCISE (LIKE A BRISK WALK)?: 0 DAYS

## 2025-06-07 SDOH — SOCIAL STABILITY: SOCIAL INSECURITY: DOES ANYONE TRY TO KEEP YOU FROM HAVING/CONTACTING OTHER FRIENDS OR DOING THINGS OUTSIDE YOUR HOME?: NO

## 2025-06-07 SDOH — ECONOMIC STABILITY: FOOD INSECURITY: WITHIN THE PAST 12 MONTHS, YOU WORRIED THAT YOUR FOOD WOULD RUN OUT BEFORE YOU GOT THE MONEY TO BUY MORE.: NEVER TRUE

## 2025-06-07 SDOH — ECONOMIC STABILITY: INCOME INSECURITY: IN THE PAST 12 MONTHS HAS THE ELECTRIC, GAS, OIL, OR WATER COMPANY THREATENED TO SHUT OFF SERVICES IN YOUR HOME?: NO

## 2025-06-07 SDOH — HEALTH STABILITY: PHYSICAL HEALTH
HOW OFTEN DO YOU NEED TO HAVE SOMEONE HELP YOU WHEN YOU READ INSTRUCTIONS, PAMPHLETS, OR OTHER WRITTEN MATERIAL FROM YOUR DOCTOR OR PHARMACY?: NEVER

## 2025-06-07 SDOH — SOCIAL STABILITY: SOCIAL INSECURITY: WERE YOU ABLE TO COMPLETE ALL THE BEHAVIORAL HEALTH SCREENINGS?: YES

## 2025-06-07 SDOH — ECONOMIC STABILITY: FOOD INSECURITY: WITHIN THE PAST 12 MONTHS, THE FOOD YOU BOUGHT JUST DIDN'T LAST AND YOU DIDN'T HAVE MONEY TO GET MORE.: NEVER TRUE

## 2025-06-07 SDOH — SOCIAL STABILITY: SOCIAL INSECURITY: WITHIN THE LAST YEAR, HAVE YOU BEEN AFRAID OF YOUR PARTNER OR EX-PARTNER?: NO

## 2025-06-07 SDOH — SOCIAL STABILITY: SOCIAL INSECURITY: DO YOU FEEL UNSAFE GOING BACK TO THE PLACE WHERE YOU ARE LIVING?: NO

## 2025-06-07 SDOH — SOCIAL STABILITY: SOCIAL INSECURITY: WITHIN THE LAST YEAR, HAVE YOU BEEN HUMILIATED OR EMOTIONALLY ABUSED IN OTHER WAYS BY YOUR PARTNER OR EX-PARTNER?: NO

## 2025-06-07 SDOH — SOCIAL STABILITY: SOCIAL INSECURITY
WITHIN THE LAST YEAR, HAVE YOU BEEN KICKED, HIT, SLAPPED, OR OTHERWISE PHYSICALLY HURT BY YOUR PARTNER OR EX-PARTNER?: NO

## 2025-06-07 SDOH — SOCIAL STABILITY: SOCIAL INSECURITY
WITHIN THE LAST YEAR, HAVE YOU BEEN RAPED OR FORCED TO HAVE ANY KIND OF SEXUAL ACTIVITY BY YOUR PARTNER OR EX-PARTNER?: NO

## 2025-06-07 SDOH — SOCIAL STABILITY: SOCIAL INSECURITY: DO YOU FEEL ANYONE HAS EXPLOITED OR TAKEN ADVANTAGE OF YOU FINANCIALLY OR OF YOUR PERSONAL PROPERTY?: NO

## 2025-06-07 SDOH — SOCIAL STABILITY: SOCIAL INSECURITY: ARE THERE ANY APPARENT SIGNS OF INJURIES/BEHAVIORS THAT COULD BE RELATED TO ABUSE/NEGLECT?: NO

## 2025-06-07 SDOH — SOCIAL STABILITY: SOCIAL INSECURITY: ABUSE: ADULT

## 2025-06-07 SDOH — SOCIAL STABILITY: SOCIAL INSECURITY: HAVE YOU HAD THOUGHTS OF HARMING ANYONE ELSE?: NO

## 2025-06-07 SDOH — SOCIAL STABILITY: SOCIAL INSECURITY: HAS ANYONE EVER THREATENED TO HURT YOUR FAMILY OR YOUR PETS?: NO

## 2025-06-07 SDOH — SOCIAL STABILITY: SOCIAL INSECURITY: HAVE YOU HAD ANY THOUGHTS OF HARMING ANYONE ELSE?: NO

## 2025-06-07 SDOH — SOCIAL STABILITY: SOCIAL INSECURITY: ARE YOU OR HAVE YOU BEEN THREATENED OR ABUSED PHYSICALLY, EMOTIONALLY, OR SEXUALLY BY ANYONE?: NO

## 2025-06-07 ASSESSMENT — LIFESTYLE VARIABLES
HOW MANY STANDARD DRINKS CONTAINING ALCOHOL DO YOU HAVE ON A TYPICAL DAY: 1 OR 2
SUBSTANCE_ABUSE_PAST_12_MONTHS: NO
AUDIT-C TOTAL SCORE: 2
HOW OFTEN DO YOU HAVE 6 OR MORE DRINKS ON ONE OCCASION: NEVER
AUDIT-C TOTAL SCORE: 2
SKIP TO QUESTIONS 9-10: 1
HOW OFTEN DO YOU HAVE A DRINK CONTAINING ALCOHOL: 2-4 TIMES A MONTH
PRESCIPTION_ABUSE_PAST_12_MONTHS: NO

## 2025-06-07 ASSESSMENT — COGNITIVE AND FUNCTIONAL STATUS - GENERAL
DAILY ACTIVITIY SCORE: 24
PATIENT BASELINE BEDBOUND: NO
MOBILITY SCORE: 24
MOBILITY SCORE: 24

## 2025-06-07 ASSESSMENT — PATIENT HEALTH QUESTIONNAIRE - PHQ9
2. FEELING DOWN, DEPRESSED OR HOPELESS: NOT AT ALL
SUM OF ALL RESPONSES TO PHQ9 QUESTIONS 1 & 2: 0
1. LITTLE INTEREST OR PLEASURE IN DOING THINGS: NOT AT ALL

## 2025-06-07 ASSESSMENT — ACTIVITIES OF DAILY LIVING (ADL)
PATIENT'S MEMORY ADEQUATE TO SAFELY COMPLETE DAILY ACTIVITIES?: YES
WALKS IN HOME: INDEPENDENT
LACK_OF_TRANSPORTATION: NO
HEARING - LEFT EAR: FUNCTIONAL
GROOMING: INDEPENDENT
TOILETING: INDEPENDENT
HEARING - RIGHT EAR: FUNCTIONAL
FEEDING YOURSELF: INDEPENDENT
DRESSING YOURSELF: INDEPENDENT
JUDGMENT_ADEQUATE_SAFELY_COMPLETE_DAILY_ACTIVITIES: YES
ADEQUATE_TO_COMPLETE_ADL: YES
BATHING: INDEPENDENT

## 2025-06-07 ASSESSMENT — PAIN DESCRIPTION - LOCATION
LOCATION: HEAD
LOCATION: LEG

## 2025-06-07 ASSESSMENT — PAIN - FUNCTIONAL ASSESSMENT
PAIN_FUNCTIONAL_ASSESSMENT: 0-10
PAIN_FUNCTIONAL_ASSESSMENT: 0-10

## 2025-06-07 ASSESSMENT — COLUMBIA-SUICIDE SEVERITY RATING SCALE - C-SSRS
1. IN THE PAST MONTH, HAVE YOU WISHED YOU WERE DEAD OR WISHED YOU COULD GO TO SLEEP AND NOT WAKE UP?: NO
2. HAVE YOU ACTUALLY HAD ANY THOUGHTS OF KILLING YOURSELF?: NO
6. HAVE YOU EVER DONE ANYTHING, STARTED TO DO ANYTHING, OR PREPARED TO DO ANYTHING TO END YOUR LIFE?: NO

## 2025-06-07 ASSESSMENT — PAIN SCALES - GENERAL
PAINLEVEL_OUTOF10: 8
PAINLEVEL_OUTOF10: 4

## 2025-06-07 NOTE — ED PROVIDER NOTES
Emergency Department Provider Note       History of Present Illness     History provided by: Patient  Limitations to History: None  External Records Reviewed with Brief Summary: History of CKD, CAD, hypertension, hyperlipidemia, gout, GERD    HPI:  Martín Capellan is a 84 y.o. male who presents to the emergency department after a fall.  Patient states that he was sitting out in the sun and when he got up he became dizzy and fell to the floor.  He struck the back of his head.  He denies loss of consciousness.  He denies back pain.  He reports mild neck pain with movement.  He denies numbness or weakness in his arms or legs.  He did not feel lightheaded or faint prior to falling.  Patient reports that he cares for his wife with dementia and has not been able to sleep the last 3 nights.    Physical Exam   Triage vitals:  T 36.2 °C (97.1 °F)  HR 53  /67  RR 16  O2 98 % None (Room air)    Physical Exam  Vitals and nursing note reviewed.   HENT:      Head: Normocephalic and atraumatic.      Nose: Nose normal.   Eyes:      Conjunctiva/sclera: Conjunctivae normal.   Neck:      Comments: Mild tenderness to palpation of the cervical spine with full range of motion.  Cardiovascular:      Rate and Rhythm: Normal rate and regular rhythm.      Pulses: Normal pulses.      Heart sounds: Normal heart sounds.   Pulmonary:      Effort: Pulmonary effort is normal.      Breath sounds: Normal breath sounds.   Abdominal:      General: Bowel sounds are normal.      Palpations: Abdomen is soft.   Musculoskeletal:         General: Normal range of motion.      Cervical back: Normal range of motion and neck supple.      Comments: Mild right knee tenderness.   Skin:     Findings: No rash.      Comments: Superficial abrasion over the occiput.   Neurological:      General: No focal deficit present.      Mental Status: He is alert and oriented to person, place, and time.   Psychiatric:         Mood and Affect: Mood normal.            Medical Decision Making & ED Course   Medical Decision Makin y.o. male who presents to the emergency department after a fall.  The patient reports the fall occurred after standing up while being in the sun for a while.  The patient reports he still feels dizzy with certain head movements.  EKG performed in the emergency department shows a sinus bradycardia, heart rate 44, when compared to 2022, the patient's EKG at that time showed bradycardia with a heart rate of 33.  This was discussed with the patient.  He reports that he sees a cardiologist for this bradycardia.  The patient was treated with meclizine.  He was further evaluated with labs and imaging.  The patient also reported right knee pain that has been occurring since prior to the fall.  The x-ray was added.  Imaging showed no acute findings.  The patient's labs showed a low sodium of 122 and an elevated creatinine of 2.41.  Previous creatinine was 2.11.  The patient appears volume depleted.  Both treat with IV fluids.  Patient requires admission for further evaluation and management.  ----      Differential diagnoses considered include but are not limited to: Hypotension, syncope, near syncope, abdominal aneurysm, cerebral vascular accident, transient ischemic attack, GI bleed, ACS, pulmonary embolus, seizure, arrhythmia.    Social Determinants of Health which Significantly Impact Care: Social Determinants of Health which Significantly Impact Care: None identified     EKG Independent Interpretation: EKG interpreted by myself. Please see ED Course for full interpretation.    Independent Result Review and Interpretation: Relevant laboratory and radiographic results were reviewed and independently interpreted by myself.  As necessary, they are commented on in the ED Course.    Chronic conditions affecting the patient's care: As documented above in MDM    The patient was discussed with the following consultants/services: Admission  Coordinator who accepted the patient for admission    Care Considerations: As documented above in [facility]    ED Course:  Diagnoses as of 06/07/25 1912   Hyponatremia   Dizziness   Bradycardia   WILBERT (acute kidney injury)       Disposition   As a result of their workup, the patient will require admission to the hospital.  The patient was informed of his diagnosis.  The patient was given the opportunity to ask questions and I answered them. The patient agreed to be admitted to the hospital.    Procedures   Procedures        Tim Morgan MD  Emergency Medicine                                                       Tim Morgan MD  06/07/25 1914

## 2025-06-07 NOTE — ED TRIAGE NOTES
Pt states when he stood up at home he became dizzy and fell backwards hitting back of head. Pt. Deneis LOC, no thinners. Pt. States has not been sleeping at home d/t wifes dementia.

## 2025-06-08 VITALS
TEMPERATURE: 97.7 F | HEIGHT: 67 IN | BODY MASS INDEX: 22.44 KG/M2 | OXYGEN SATURATION: 100 % | DIASTOLIC BLOOD PRESSURE: 72 MMHG | SYSTOLIC BLOOD PRESSURE: 174 MMHG | RESPIRATION RATE: 16 BRPM | WEIGHT: 143 LBS | HEART RATE: 53 BPM

## 2025-06-08 LAB
ANION GAP SERPL CALC-SCNC: 11 MMOL/L (ref 10–20)
ANION GAP SERPL CALC-SCNC: 14 MMOL/L (ref 10–20)
ANION GAP SERPL CALC-SCNC: 15 MMOL/L (ref 10–20)
BUN SERPL-MCNC: 39 MG/DL (ref 6–23)
BUN SERPL-MCNC: 40 MG/DL (ref 6–23)
BUN SERPL-MCNC: 43 MG/DL (ref 6–23)
CALCIUM SERPL-MCNC: 8.1 MG/DL (ref 8.6–10.3)
CALCIUM SERPL-MCNC: 8.6 MG/DL (ref 8.6–10.3)
CALCIUM SERPL-MCNC: 8.9 MG/DL (ref 8.6–10.3)
CHLORIDE SERPL-SCNC: 95 MMOL/L (ref 98–107)
CHLORIDE SERPL-SCNC: 98 MMOL/L (ref 98–107)
CHLORIDE SERPL-SCNC: 98 MMOL/L (ref 98–107)
CO2 SERPL-SCNC: 20 MMOL/L (ref 21–32)
CO2 SERPL-SCNC: 21 MMOL/L (ref 21–32)
CO2 SERPL-SCNC: 21 MMOL/L (ref 21–32)
CREAT SERPL-MCNC: 2.25 MG/DL (ref 0.5–1.3)
CREAT SERPL-MCNC: 2.29 MG/DL (ref 0.5–1.3)
CREAT SERPL-MCNC: 2.39 MG/DL (ref 0.5–1.3)
EGFRCR SERPLBLD CKD-EPI 2021: 26 ML/MIN/1.73M*2
EGFRCR SERPLBLD CKD-EPI 2021: 28 ML/MIN/1.73M*2
EGFRCR SERPLBLD CKD-EPI 2021: 28 ML/MIN/1.73M*2
ERYTHROCYTE [DISTWIDTH] IN BLOOD BY AUTOMATED COUNT: 13.7 % (ref 11.5–14.5)
GLUCOSE SERPL-MCNC: 115 MG/DL (ref 74–99)
GLUCOSE SERPL-MCNC: 90 MG/DL (ref 74–99)
GLUCOSE SERPL-MCNC: 98 MG/DL (ref 74–99)
HCT VFR BLD AUTO: 26.6 % (ref 41–52)
HGB BLD-MCNC: 9.3 G/DL (ref 13.5–17.5)
MCH RBC QN AUTO: 33.1 PG (ref 26–34)
MCHC RBC AUTO-ENTMCNC: 35 G/DL (ref 32–36)
MCV RBC AUTO: 95 FL (ref 80–100)
NRBC BLD-RTO: 0 /100 WBCS (ref 0–0)
OSMOLALITY SERPL: 281 MOSM/KG (ref 280–300)
OSMOLALITY UR: 217 MOSM/KG (ref 200–1200)
PLATELET # BLD AUTO: 219 X10*3/UL (ref 150–450)
POTASSIUM SERPL-SCNC: 3.7 MMOL/L (ref 3.5–5.3)
POTASSIUM SERPL-SCNC: 3.9 MMOL/L (ref 3.5–5.3)
POTASSIUM SERPL-SCNC: 4.6 MMOL/L (ref 3.5–5.3)
PROT SERPL-MCNC: 6.8 G/DL (ref 6.4–8.2)
RBC # BLD AUTO: 2.81 X10*6/UL (ref 4.5–5.9)
SODIUM SERPL-SCNC: 126 MMOL/L (ref 136–145)
SODIUM SERPL-SCNC: 126 MMOL/L (ref 136–145)
SODIUM SERPL-SCNC: 128 MMOL/L (ref 136–145)
WBC # BLD AUTO: 6.5 X10*3/UL (ref 4.4–11.3)

## 2025-06-08 PROCEDURE — 83930 ASSAY OF BLOOD OSMOLALITY: CPT | Mod: AHULAB | Performed by: INTERNAL MEDICINE

## 2025-06-08 PROCEDURE — 36415 COLL VENOUS BLD VENIPUNCTURE: CPT | Performed by: FAMILY MEDICINE

## 2025-06-08 PROCEDURE — 2500000001 HC RX 250 WO HCPCS SELF ADMINISTERED DRUGS (ALT 637 FOR MEDICARE OP): Performed by: FAMILY MEDICINE

## 2025-06-08 PROCEDURE — 80048 BASIC METABOLIC PNL TOTAL CA: CPT | Performed by: FAMILY MEDICINE

## 2025-06-08 PROCEDURE — 82436 ASSAY OF URINE CHLORIDE: CPT | Performed by: INTERNAL MEDICINE

## 2025-06-08 PROCEDURE — 82374 ASSAY BLOOD CARBON DIOXIDE: CPT | Performed by: FAMILY MEDICINE

## 2025-06-08 PROCEDURE — 85027 COMPLETE CBC AUTOMATED: CPT | Performed by: FAMILY MEDICINE

## 2025-06-08 PROCEDURE — 83935 ASSAY OF URINE OSMOLALITY: CPT | Mod: AHULAB | Performed by: INTERNAL MEDICINE

## 2025-06-08 PROCEDURE — 84155 ASSAY OF PROTEIN SERUM: CPT | Mod: AHULAB | Performed by: INTERNAL MEDICINE

## 2025-06-08 RX ORDER — TRAMADOL HYDROCHLORIDE 50 MG/1
25 TABLET, FILM COATED ORAL EVERY 8 HOURS PRN
Status: DISCONTINUED | OUTPATIENT
Start: 2025-06-08 | End: 2025-06-08 | Stop reason: HOSPADM

## 2025-06-08 RX ADMIN — FAMOTIDINE 20 MG: 20 TABLET, FILM COATED ORAL at 08:13

## 2025-06-08 RX ADMIN — TRAMADOL HYDROCHLORIDE 25 MG: 50 TABLET, COATED ORAL at 14:21

## 2025-06-08 RX ADMIN — ASPIRIN 81 MG: 81 TABLET, CHEWABLE ORAL at 08:13

## 2025-06-08 ASSESSMENT — COGNITIVE AND FUNCTIONAL STATUS - GENERAL
DAILY ACTIVITIY SCORE: 24
MOBILITY SCORE: 24

## 2025-06-08 ASSESSMENT — PAIN SCALES - GENERAL
PAINLEVEL_OUTOF10: 4
PAINLEVEL_OUTOF10: 8
PAINLEVEL_OUTOF10: 0 - NO PAIN

## 2025-06-08 ASSESSMENT — PAIN - FUNCTIONAL ASSESSMENT
PAIN_FUNCTIONAL_ASSESSMENT: 0-10
PAIN_FUNCTIONAL_ASSESSMENT: 0-10

## 2025-06-08 NOTE — CONSULTS
"Inpatient consult to Cardiology  Consult performed by: Mary Duran, APRN-CNP  Consult ordered by: Billy Tidwell MD  Reason for consult: bradycardia      Cards: Jimenez 1/9/2025  History Of Present Illness:    Martín Capellan is a 84 y.o. 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  Presents to [facility] after a fall.  Patient states that he was sitting in the sun went to get up became dizzy and fell to the floor.  Cardiology is now consulted for \"bradycardia\"    Patient claims he was at home sitting in the sun had 2 sips of his wine went to get up and fell down.  Patient denies losing consciousness.  Prior to the event patient said he \"had a weird feeling over his body\" he denies any kind of overt lightheadedness says he just did not feel right.  He said he just followed up with his kidney doctor 2 weeks ago who started him on hydrochlorothiazide for his blood pressure.  He has not been eating or drinking very well he takes care of his wife who has dementia he works and takes care of the house.  He says prior to the event he had a right knee pain has been keeping him up at night for the last 2 or 3 nights he has had 0 sleep.  Currently he feels good he denies any, shortness of breath, nausea, vomiting, lightheadedness.  He currently wants to go home and follow-up as outpatient with his providers.    Afebrile, heart rate 49, blood pressure 146/55, 97% on room air.  On admit his blood pressure was 200/68. Notable labs sodium 126, BUN/CR 40/2.25(b/l 2.11), high sensitive troponin 17/19, H&H 9.3/26.6, CT of the head was nonacute.  In the ER he has been started on his home medications, including " hydralazine 10 mg IV push as well as IV fluids for volume depletion.    Home cardiac medications include aspirin 81 mg daily, Farxiga 10 mg daily, ezetamide 10 mg daily, hydrochlorothiazide 25 mg daily and losartan 50 mg daily    Admit EKG 6/7/25 second-degree AV block type I(consistent with prior EKGs)    Past Cardiology Tests (Last 3 Years):  Echo:  Echocardiogram March 25, 2017  CONCLUSIONS:   1. The left ventricular systolic function is hyperdynamic with a 65-70% estimated ejection fraction.   2. Spectral Doppler shows an impaired relaxation pattern of left ventricular diastolic filling.   3. The left atrium is upper limits of normal in size.     Cath:  Cardiac catheterization March 24, 2017  Coronary Lesion Summary:  Vessel         Stenosis   Vessel Segment  Left Main    90% stenosis     distal  LAD          80% stenosis     ostial  2nd Diagonal 80% stenosis     ostial  Circumflex   80% stenosis     ostial  Circumflex   40% stenosis      mid  Ramus        95% stenosis     ostial  CONCLUSIONS:   1. Severe CAD involving distal LM, ostial LAD, LCX, ramus and diagonal.   2. Elevated LVEDP.   3. No aortic stenosis.    Carotid ultrasound 6/26/24  CONCLUSIONS:  Right Carotid: Findings are consistent with greater than 70% stenosis of the right proximal internal carotid artery. Turbulent flow seen by color Doppler. Right ICA proximal segment degree of stenosis may be underestimated due to calcified shadowing plaque. There are elevated velocities in the right ECA that are suggestive of disease. There is a >50% stenosis noted in the right external carotid artery. No evidence of hemodynamically significant stenosis of the right common carotid artery. The right vertebral artery Known abnormal pre steal waveforms are noted in the right vertebral artery suggestive of proximal stenosis. There are elevated velocities in the right subclavian artery that are suggestive of disease. There is a >50% stenosis noted in the right  subclavian artery.  Left Carotid: Findings are consistent with less than 50% stenosis of the left proximal internal carotid artery. The left proximal internal carotid artery is normal. Left ICA proximal segment degree of stenosis may be underestimated due to calcified shadowing plaque. Unable to obtain a doppler signal in the left ECA that is suggestive of occlusion. No evidence of hemodynamically significant stenosis of the left common carotid artery. The left vertebral artery is patent with antegrade flow. There are elevated velocities in the left subclavian artery that are suggestive of disease. There is a >50% stenosis noted in the left subclavian artery.     Comparison:  Compared with study from 3/23/2023, On todays exam the left subclavian artery is noted with a >50% stenosis noted at the proximal segment of the vessel and the left ICA proximal is noted with <50% stenosis.       Past Medical History:  He has no past medical history on file.    Past Surgical History:  He has a past surgical history that includes Coronary artery bypass graft (04/26/2022).      Social History:  He reports that he has quit smoking. His smoking use included cigarettes. He has never used smokeless tobacco. He reports current alcohol use of about 2.0 standard drinks of alcohol per week. He reports that he does not currently use drugs.    Family History:  Family History[1]     Allergies:  Patient has no known allergies.    ROS:  10 point review of systems including (Constitutional, Eyes, ENMT, Respiratory, Cardiac, Gastrointestinal, Neurological, Psychiatric, and Hematologic) was performed and is otherwise negative.    Objective Data:  Last Recorded Vitals:  Vitals:    06/07/25 2000 06/07/25 2015 06/07/25 2116 06/08/25 0330   BP: 160/50 146/55 164/61 165/59   BP Location:   Left arm Left arm   Patient Position:   Lying Lying   Pulse: (!) 49 (!) 49     Resp:  17     Temp:   36.6 °C (97.9 °F) 36.7 °C (98 °F)   TempSrc:   Temporal   "  SpO2: 100% 97%  99%   Weight:       Height:         Medical Gas Therapy: None (Room air)  Weight  Av.9 kg (143 lb)  Min: 64.9 kg (143 lb)  Max: 64.9 kg (143 lb)      LABS:  CMP:  Results from last 7 days   Lab Units 25  0518 25  0011 25  1502   SODIUM mmol/L 126* 126* 122*   POTASSIUM mmol/L 3.9 3.7 4.6   CHLORIDE mmol/L 98 95* 91*   CO2 mmol/L 21 21 21   ANION GAP mmol/L 11 14 15   BUN mg/dL 40* 43* 43*   CREATININE mg/dL 2.25* 2.39* 2.41*   EGFR mL/min/1.73m*2 28* 26* 26*   MAGNESIUM mg/dL  --   --  2.39   ALBUMIN g/dL  --   --  4.1   ALT U/L  --   --  18   AST U/L  --   --  24   BILIRUBIN TOTAL mg/dL  --   --  0.6     CBC:  Results from last 7 days   Lab Units 25  0518 25  1502   WBC AUTO x10*3/uL 6.5 6.8   HEMOGLOBIN g/dL 9.3* 9.8*   HEMATOCRIT % 26.6* 28.6*   PLATELETS AUTO x10*3/uL 219 227   MCV fL 95 96     COAG:     ABO: No results found for: \"ABO\"  HEME/ENDO:     CARDIAC:   Results from last 7 days   Lab Units 25  1635 25  1502   TROPHS ng/L 19 17             Last I/O:  No intake or output data in the 24 hours ending 25 0725  Net IO Since Admission: No IO data has been entered for this period [25]      Imaging Results:  XR knee right 4+ views  Result Date: 2025  Interpreted By:  Bertin Trujillo, STUDY: XR KNEE RIGHT 4+ VIEWS; ;  2025 5:50 pm   INDICATION: Signs/Symptoms:fall.     COMPARISON: None.   ACCESSION NUMBER(S): [id number]   ORDERING CLINICIAN: KATIE WINTER   FINDINGS: Right knee, four views   There is no fracture. There is no dislocation. There are no degenerative changes. There is no lytic or sclerotic lesion. There is no soft tissue abnormality seen. There is no effusion       No acute abnormality seen     MACRO: None   Signed by: Bertin Trujillo 2025 5:51 PM Dictation workstation:   [id number]    CT cervical spine wo IV contrast  Result Date: 2025  Interpreted By:  Elvi Foreman, STUDY: CT CERVICAL SPINE " WO IV CONTRAST;  6/7/2025 3:12 pm   INDICATION: Signs/Symptoms:fall.   COMPARISON: None.   ACCESSION NUMBER(S): [id number]   ORDERING CLINICIAN: KATIE WINTER   TECHNIQUE: Axial CT images of the cervical spine are obtained. Axial, coronal and sagittal reconstructions are provided for review.   FINDINGS: No acute fracture or subluxation. No vertebral body height loss. Multilevel disc height loss C3-C7, moderate to severe. Multilevel marginal osteophytes C3-C7. Multilevel facet arthropathy bilaterally, moderate on the left and mild-to-moderate on the right. Right-sided neural foraminal narrowing at C3-C4, on the left at C4-C5, bilaterally at C5-C6. No prevertebral hematoma. Atherosclerosis.       No evidence for an acute fracture or subluxation of the cervical spine.   Degenerative changes with multilevel bilateral neural foraminal narrowing.   Signed by: Elvi Foreman 6/7/2025 3:57 PM Dictation workstation:   [id number]    CT head wo IV contrast  Result Date: 6/7/2025  Interpreted By:  Elvi Foreman, STUDY: CT HEAD WO IV CONTRAST;  6/7/2025 3:12 pm   INDICATION: Signs/Symptoms:dizzy/ fall/ head injury.   COMPARISON: None.   ACCESSION NUMBER(S): [id number]   ORDERING CLINICIAN: KATIE WINTER   TECHNIQUE: Noncontrast axial CT scan of head was performed. Multiplanar reconstructions   FINDINGS: No acute intracranial hemorrhage, mass effect, midline shift, or herniation. No evidence of hydrocephalus. Moderate periventricular and subcortical deep white matter hypodensity suggestive of chronic microangiopathic change. The ventricles and sulci are unremarkable for age. Right mastoidectomy. Partially visualized mucous retention cyst in the right maxillary sinus. No acute osseous abnormality of the calvarium. No destructive bone lesion. Soft tissue swelling of the scalp posteriorly near vertex.       No acute intracranial abnormality. Consider follow-up with MRI as warranted.     Signed by: Elvi Foreman  "6/7/2025 3:51 PM Dictation workstation:   [id number]      Inpatient Medications:  Scheduled Medications[2]  PRN Medications[3]  Continuous Medications[4]    Outpatient Medications:  Current Outpatient Medications   Medication Instructions    aspirin 81 mg chewable tablet 1 tablet, Daily    cholecalciferol, vitamin D3, (VITAMIN D3 ORAL) 1 tablet, Daily RT    dapagliflozin propanediol (FARXIGA) 10 mg, Every 24 hours    ezetimibe (ZETIA) 10 mg, oral, Nightly    hydroCHLOROthiazide (HYDRODIURIL) 25 mg, oral, Daily    losartan (COZAAR) 50 mg, oral, Daily       Physical Exam:  General:  Patient is awake, alert, and oriented.  Patient is in no acute distress.  HEENT:  Pupils equal and reactive.  Normocephalic.  Moist mucosa.    Neck:  No thyromegaly.  Normal Jugular Venous Pressure.  Cardiovascular:  Regular rate and rhythm.  Normal S1 and S2.  Pulmonary:  Clear to auscultation bilaterally.  Abdomen:  Soft. Non-tender.   Non-distended.  Positive bowel sounds.  Lower Extremities:  2+ pedal pulses. No LE edema.  Neurologic:  Cranial nerves intact.  No focal deficit.   Skin: Skin warm and dry, normal skin turgor.   Psychiatric: Normal affect.     Assessment/Plan   Cards: Tony 1/9/2025    Martín Capellan is a 84 y.o. 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  Presents to [facility] after a fall.  Patient states that he was sitting in the sun went to get up became dizzy and fell to the floor.  Cardiology is now consulted for \"bradycardia\"      Home cardiac medications include aspirin 81 mg daily, Farxiga 10 mg daily, ezetamide 10 mg daily, hydrochlorothiazide 25 mg daily and losartan 50 mg daily    #Pre " Syncope 2/2 likely hypovolemia-sodium on admit 122, creatinine 2.4, patient denies passing out  #Hx of Second-degree AV block type I-heart rates currently in the 40s to 50s, no high-grade AV arian block noted  #History of CAD status post CABG with preserved LV function  #History of hyperlipidemia  #History of hypertension-urgency on arrival  #WILBERT on CKD-baseline looks to be around 2.1 creatinine  #Moderate Cartoid Stenosis-seen by vascular in the past patient has declined any interventions    RECS:  -Stop home hydrochlorothiazide  -Encourage fluid reusuticiation   -Live event monitor this can be placed in [facility] on business hours Monday through Friday  -Can increase home losartan if needed for blood pressure control  -We will obtain a transthoracic echocardiogram for structural evaluation including ejection fraction, assessment of regional wall motion abnormalities or valvular disease, and further evaluation of hemodynamics.  -->> this can be done outpatient    Code Status:  Full Code    I spent 30 minutes in the professional and overall care of this patient.        Mary Duran, APRN-CNP          [1]   Family History  Problem Relation Name Age of Onset    No Known Problems Mother      No Known Problems Father     [2]   Scheduled medications   Medication Dose Route Frequency    aspirin  81 mg oral Daily    ezetimibe  10 mg oral Nightly    famotidine  20 mg oral Daily    Or    famotidine  20 mg intravenous Daily    heparin (porcine)  5,000 Units subcutaneous q8h [facility]   [3]   PRN medications   Medication    acetaminophen    Or    acetaminophen    Or    acetaminophen    acetaminophen    Or    acetaminophen    Or    acetaminophen    guaiFENesin    ondansetron    Or    ondansetron    traMADol   [4]   Continuous Medications   Medication Dose Last Rate    sodium chloride 0.9%  100 mL/hr 100 mL/hr (06/07/25 2122)

## 2025-06-08 NOTE — CARE PLAN
The patient's goals for the shift include      The clinical goals for the shift include maintain patent safety  Problem: Pain - Adult  Goal: Verbalizes/displays adequate comfort level or baseline comfort level  Outcome: Progressing     Problem: Safety - Adult  Goal: Free from fall injury  Outcome: Progressing     Problem: Chronic Conditions and Co-morbidities  Goal: Patient's chronic conditions and co-morbidity symptoms are monitored and maintained or improved  Outcome: Progressing

## 2025-06-08 NOTE — DISCHARGE SUMMARY
Discharge Diagnosis  Hyponatremia           Issues Requiring Follow-Up  hyponatremia    Discharge Meds     Medication List      CONTINUE taking these medications     aspirin 81 mg chewable tablet   dapagliflozin propanediol 10 mg tablet; Commonly known as: Farxiga   ezetimibe 10 mg tablet; Commonly known as: Zetia; Take 1 tablet (10 mg)   by mouth once daily at bedtime.   losartan 100 mg tablet; Commonly known as: Cozaar; Take 0.5 tablets (50   mg) by mouth once daily.   VITAMIN D3 ORAL     STOP taking these medications     hydroCHLOROthiazide 25 mg tablet; Commonly known as: HYDRODiuril       Test Results Pending At Discharge  Pending Labs       Order Current Status    Urine electrolytes Collected (06/08/25 1140)    Osmolality In process    Osmolality, urine In process    Protein, Total In process    Serum Protein Electrophoresis + Immunofixation In process    Serum Protein Electrophoresis + Immunofixation In process            Hospital Course   Pt is 84 yr old male with coronary disease, hypertension, CKD stage IV followed by Dr. Aldridge ,hyperlipidemia.   Came to ED for fall and admitted for hyponatremia and given fluids , hydrochlorothiazide stopped and his sodium is slowly improving to 128  He will be discharged to home     Pertinent Physical Exam At Time of Discharge  Physical Exam    Outpatient Follow-Up  Future Appointments   Date Time Provider Department Center   6/25/2025 10:00 AM [facility] VASC 1 [id number] [facility]   6/25/2025 11:00 AM John Jimenez MD [id number] [facility]   8/18/2025  9:00 AM Peter Aldridge MD [id number] [facility]   10/20/2025 10:00 AM Stanislav Rousseau MD [id number] [facility]         Billy Tidwell MD

## 2025-06-08 NOTE — CONSULTS
Reason For Consult  Hyponatremia    History Of Present Illness  Martín Capellan is a 84 y.o. male presenting with dizziness and fell backwards hitting the back of his head.  Patient reports that he was having sun tan, once he stood up felt dizzy and fell backwards.  Reports he did not lose consciousness or any bleeding he has multiple medical problems including coronary disease, hypertension, CKD stage IV followed by Dr. Aldridge ,hyperlipidemia.  Emerged part with his heart rate was 49 with blood pressure 146/55, during this hospitalization course in ED his blood pressure was found to be up to 200/68 sodium level being 126.  Patient's home cardiac medications include aspirin, Farxiga, ezetmide, losartan and hydrochlorothiazide  Patient found in the ED to have bradycardia arrhythmia with bradycardia and cardiology was consulted.  Also found to have hyponatremia  Past Medical History  He has no past medical history on file.    Surgical History  He has a past surgical history that includes Coronary artery bypass graft (04/26/2022).     Social History  He reports that he has quit smoking. His smoking use included cigarettes. He has never used smokeless tobacco. He reports current alcohol use of about 2.0 standard drinks of alcohol per week. He reports that he does not currently use drugs.    Family History  Family History[1]     Allergies  Patient has no known allergies.    Review of Systems  All systems were reviewed     Physical Exam  General appearance: Awake alert no distress  Head and ENT; normocephalic/atraumatic/supple neck/no JVD  Lungs; CTA  Heart; RRR  Abdomen; soft no organomegaly no tenderness  Extremities; no edema  Neurologic; physiologic        I&O 24HR    Intake/Output Summary (Last 24 hours) at 6/8/2025 1331  Last data filed at 6/8/2025 0924  Gross per 24 hour   Intake 1000 ml   Output --   Net 1000 ml       Vitals 24HR  Heart Rate:  [38-56]   Temp:  [36.2 °C (97.1 °F)-37.1 °C (98.8 °F)]   Resp:  [13-22]  "  BP: (114-207)/(50-87)   Height:  [170.2 cm (5' 7\")]   Weight:  [64.9 kg (143 lb)]   SpO2:  [97 %-100 %]         Relevant Results  Results from last 7 days   Lab Units 06/08/25  0518 06/07/25  1502   WBC AUTO x10*3/uL 6.5 6.8   HEMOGLOBIN g/dL 9.3* 9.8*   HEMATOCRIT % 26.6* 28.6*   PLATELETS AUTO x10*3/uL 219 227      Results from last 7 days   Lab Units 06/08/25  0755 06/08/25  0518 06/08/25  0011   SODIUM mmol/L 128* 126* 126*   POTASSIUM mmol/L 4.6 3.9 3.7   CHLORIDE mmol/L 98 98 95*   CO2 mmol/L 20* 21 21   BUN mg/dL 39* 40* 43*   CREATININE mg/dL 2.29* 2.25* 2.39*   GLUCOSE mg/dL 98 90 115*   CALCIUM mg/dL 8.9 8.1* 8.6    XR knee right 4+ views  Result Date: 6/7/2025  Interpreted By:  Bertin Trujillo, STUDY: XR KNEE RIGHT 4+ VIEWS; ;  6/7/2025 5:50 pm   INDICATION: Signs/Symptoms:fall.     COMPARISON: None.   ACCESSION NUMBER(S): [id number]   ORDERING CLINICIAN: KATIE WINTER   FINDINGS: Right knee, four views   There is no fracture. There is no dislocation. There are no degenerative changes. There is no lytic or sclerotic lesion. There is no soft tissue abnormality seen. There is no effusion       No acute abnormality seen     MACRO: None   Signed by: Bertin Trujillo 6/7/2025 5:51 PM Dictation workstation:   [id number]    CT cervical spine wo IV contrast  Result Date: 6/7/2025  Interpreted By:  Elvi Foreman, STUDY: CT CERVICAL SPINE WO IV CONTRAST;  6/7/2025 3:12 pm   INDICATION: Signs/Symptoms:fall.   COMPARISON: None.   ACCESSION NUMBER(S): [id number]   ORDERING CLINICIAN: KATIE WINTER   TECHNIQUE: Axial CT images of the cervical spine are obtained. Axial, coronal and sagittal reconstructions are provided for review.   FINDINGS: No acute fracture or subluxation. No vertebral body height loss. Multilevel disc height loss C3-C7, moderate to severe. Multilevel marginal osteophytes C3-C7. Multilevel facet arthropathy bilaterally, moderate on the left and mild-to-moderate on the right. Right-sided " neural foraminal narrowing at C3-C4, on the left at C4-C5, bilaterally at C5-C6. No prevertebral hematoma. Atherosclerosis.       No evidence for an acute fracture or subluxation of the cervical spine.   Degenerative changes with multilevel bilateral neural foraminal narrowing.   Signed by: Elvi Foreman 6/7/2025 3:57 PM Dictation workstation:   [id number]    CT head wo IV contrast  Result Date: 6/7/2025  Interpreted By:  Elvi Foreman, STUDY: CT HEAD WO IV CONTRAST;  6/7/2025 3:12 pm   INDICATION: Signs/Symptoms:dizzy/ fall/ head injury.   COMPARISON: None.   ACCESSION NUMBER(S): [id number]   ORDERING CLINICIAN: KATIE WINTER   TECHNIQUE: Noncontrast axial CT scan of head was performed. Multiplanar reconstructions   FINDINGS: No acute intracranial hemorrhage, mass effect, midline shift, or herniation. No evidence of hydrocephalus. Moderate periventricular and subcortical deep white matter hypodensity suggestive of chronic microangiopathic change. The ventricles and sulci are unremarkable for age. Right mastoidectomy. Partially visualized mucous retention cyst in the right maxillary sinus. No acute osseous abnormality of the calvarium. No destructive bone lesion. Soft tissue swelling of the scalp posteriorly near vertex.       No acute intracranial abnormality. Consider follow-up with MRI as warranted.     Signed by: Elvi Foreman 6/7/2025 3:51 PM Dictation workstation:   [id number]        Assessment & Plan  Hyponatremia    CAD (coronary artery disease)    Chronic kidney disease    Hyperlipidemia    Hypertension    Mobitz (type) I (Wenckebach's) atrioventricular block    Bradycardia    Chronic kidney disease, stage 4 (severe) (Multi)      Impression and plan:  1.  Hyponatremia, prior related to hydrochlorothiazide  We will discontinue hydrochlorothiazide, avoid SSRI, avoid contrast dye  Will check urine and serum osmolality, electrolytes, SPEP and UPEP  2.  CKD stage IV will follow closely his labs  and exams on daily basis  3.  Bradycardia with Mobitz type I will forward cardiology  4.  Essential hypertension will follow meds and clinical status daily    Will examine patient daily reviewing labs and other consultants input with daily BMP and CBC with differential.                I spent 56 minutes in the professional and overall care of this patient.      Tri Tierney MD         [1]   Family History  Problem Relation Name Age of Onset    No Known Problems Mother      No Known Problems Father

## 2025-06-08 NOTE — H&P
History Of Present Illness  Martín Capellan is a 84 y.o. male presenting with fall and hit his head  Per pt no LOC  No nausea vomiting   He was recenty started on hydrochlorothiazide z and in ER noted to have hyponatremia of 122  Admitted   Started on fluids and today Na is 126 -128  Pt is feeling fine   No diarrhea  Hydrochlorothiazide has been stopped  Seen cardio today for bradycardia which has been ongoing issue and seeing Dr Tony Lang cardio and renal and ok for dc to home      Past Medical History  He has no past medical history on file.  coronary disease, hypertension, CKD stage IV followed by Dr. Aldridge ,hyperlipidemia.   Surgical History  He has a past surgical history that includes Coronary artery bypass graft (04/26/2022).     Social History  He reports that he has quit smoking. His smoking use included cigarettes. He has never used smokeless tobacco. He reports current alcohol use of about 2.0 standard drinks of alcohol per week. He reports that he does not currently use drugs.    Family History  Family History[1]     Allergies  Patient has no known allergies.    Review of Systems     No chest pain   No shortness of breath  No cough  No fever  No chills  No nausea vomitign or constipation   No abdo pain   No wt loss  No change in urine     Physical Exam     Well developed well nurished very anxious  No distress  Ao 3  Face symmetrical   Neck no jvd no bruit  Chest clear  CVS regular  Ext no edema  Abdo soft nontender bs active, no masses  Cns alert appropriate able to move ext   Skin intact  Psych normal affect    Last Recorded Vitals  /72 (BP Location: Right arm, Patient Position: Sitting)   Pulse 53   Temp 36.5 °C (97.7 °F) (Temporal)   Resp 16   Wt 64.9 kg (143 lb)   SpO2 100%     Relevant Results    Scheduled medications  Scheduled Medications[2]  Continuous medications  Continuous Medications[3]  PRN medications  PRN Medications[4]  Results for orders placed or performed during the  hospital encounter of 06/07/25 (from the past 96 hours)   CBC and Auto Differential   Result Value Ref Range    WBC 6.8 4.4 - 11.3 x10*3/uL    nRBC 0.0 0.0 - 0.0 /100 WBCs    RBC 2.98 (L) 4.50 - 5.90 x10*6/uL    Hemoglobin 9.8 (L) 13.5 - 17.5 g/dL    Hematocrit 28.6 (L) 41.0 - 52.0 %    MCV 96 80 - 100 fL    MCH 32.9 26.0 - 34.0 pg    MCHC 34.3 32.0 - 36.0 g/dL    RDW 13.8 11.5 - 14.5 %    Platelets 227 150 - 450 x10*3/uL    Neutrophils % 75.8 40.0 - 80.0 %    Immature Granulocytes %, Automated 0.4 0.0 - 0.9 %    Lymphocytes % 12.6 13.0 - 44.0 %    Monocytes % 9.0 2.0 - 10.0 %    Eosinophils % 1.9 0.0 - 6.0 %    Basophils % 0.3 0.0 - 2.0 %    Neutrophils Absolute 5.13 1.60 - 5.50 x10*3/uL    Immature Granulocytes Absolute, Automated 0.03 0.00 - 0.50 x10*3/uL    Lymphocytes Absolute 0.85 0.80 - 3.00 x10*3/uL    Monocytes Absolute 0.61 0.05 - 0.80 x10*3/uL    Eosinophils Absolute 0.13 0.00 - 0.40 x10*3/uL    Basophils Absolute 0.02 0.00 - 0.10 x10*3/uL   Comprehensive metabolic panel   Result Value Ref Range    Glucose 84 74 - 99 mg/dL    Sodium 122 (L) 136 - 145 mmol/L    Potassium 4.6 3.5 - 5.3 mmol/L    Chloride 91 (L) 98 - 107 mmol/L    Bicarbonate 21 21 - 32 mmol/L    Anion Gap 15 10 - 20 mmol/L    Urea Nitrogen 43 (H) 6 - 23 mg/dL    Creatinine 2.41 (H) 0.50 - 1.30 mg/dL    eGFR 26 (L) >60 mL/min/1.73m*2    Calcium 8.3 (L) 8.6 - 10.3 mg/dL    Albumin 4.1 3.4 - 5.0 g/dL    Alkaline Phosphatase 60 33 - 136 U/L    Total Protein 6.0 (L) 6.4 - 8.2 g/dL    AST 24 9 - 39 U/L    Bilirubin, Total 0.6 0.0 - 1.2 mg/dL    ALT 18 10 - 52 U/L   Magnesium   Result Value Ref Range    Magnesium 2.39 1.60 - 2.40 mg/dL   Troponin I, High Sensitivity, Initial   Result Value Ref Range    Troponin I, High Sensitivity 17 0 - 20 ng/L   Troponin, High Sensitivity, 1 Hour   Result Value Ref Range    Troponin I, High Sensitivity 19 0 - 20 ng/L   Basic metabolic panel   Result Value Ref Range    Glucose 115 (H) 74 - 99 mg/dL    Sodium 126  (L) 136 - 145 mmol/L    Potassium 3.7 3.5 - 5.3 mmol/L    Chloride 95 (L) 98 - 107 mmol/L    Bicarbonate 21 21 - 32 mmol/L    Anion Gap 14 10 - 20 mmol/L    Urea Nitrogen 43 (H) 6 - 23 mg/dL    Creatinine 2.39 (H) 0.50 - 1.30 mg/dL    eGFR 26 (L) >60 mL/min/1.73m*2    Calcium 8.6 8.6 - 10.3 mg/dL   Basic metabolic panel   Result Value Ref Range    Glucose 90 74 - 99 mg/dL    Sodium 126 (L) 136 - 145 mmol/L    Potassium 3.9 3.5 - 5.3 mmol/L    Chloride 98 98 - 107 mmol/L    Bicarbonate 21 21 - 32 mmol/L    Anion Gap 11 10 - 20 mmol/L    Urea Nitrogen 40 (H) 6 - 23 mg/dL    Creatinine 2.25 (H) 0.50 - 1.30 mg/dL    eGFR 28 (L) >60 mL/min/1.73m*2    Calcium 8.1 (L) 8.6 - 10.3 mg/dL   CBC   Result Value Ref Range    WBC 6.5 4.4 - 11.3 x10*3/uL    nRBC 0.0 0.0 - 0.0 /100 WBCs    RBC 2.81 (L) 4.50 - 5.90 x10*6/uL    Hemoglobin 9.3 (L) 13.5 - 17.5 g/dL    Hematocrit 26.6 (L) 41.0 - 52.0 %    MCV 95 80 - 100 fL    MCH 33.1 26.0 - 34.0 pg    MCHC 35.0 32.0 - 36.0 g/dL    RDW 13.7 11.5 - 14.5 %    Platelets 219 150 - 450 x10*3/uL   Basic metabolic panel   Result Value Ref Range    Glucose 98 74 - 99 mg/dL    Sodium 128 (L) 136 - 145 mmol/L    Potassium 4.6 3.5 - 5.3 mmol/L    Chloride 98 98 - 107 mmol/L    Bicarbonate 20 (L) 21 - 32 mmol/L    Anion Gap 15 10 - 20 mmol/L    Urea Nitrogen 39 (H) 6 - 23 mg/dL    Creatinine 2.29 (H) 0.50 - 1.30 mg/dL    eGFR 28 (L) >60 mL/min/1.73m*2    Calcium 8.9 8.6 - 10.3 mg/dL              Assessment/Plan   Assessment & Plan  Hyponatremia    CAD (coronary artery disease)    Chronic kidney disease    Hyperlipidemia    Hypertension    Mobitz (type) I (Wenckebach's) atrioventricular block    Bradycardia    Chronic kidney disease, stage 4 (severe) (Multi)      Stop hydrochlorothiazide  Resume rest of meds  Follow pu with cardio   Pt has appt  Repeat labs prior to seeing pcp  Follow up with renal  Dw family       Billy Tidwell MD         [1]   Family History  Problem Relation Name Age of Onset     No Known Problems Mother      No Known Problems Father     [2] aspirin, 81 mg, oral, Daily  ezetimibe, 10 mg, oral, Nightly  famotidine, 20 mg, oral, Daily   Or  famotidine, 20 mg, intravenous, Daily  heparin (porcine), 5,000 Units, subcutaneous, q8h ABIDA  [3] sodium chloride 0.9%, 100 mL/hr, Last Rate: 100 mL/hr (06/08/25 0924)  [4] PRN medications: acetaminophen **OR** acetaminophen **OR** acetaminophen, acetaminophen **OR** acetaminophen **OR** acetaminophen, guaiFENesin, ondansetron **OR** ondansetron, traMADol

## 2025-06-09 LAB
CHLORIDE UR-SCNC: 37 MMOL/L
CHLORIDE/CREATININE (MMOL/G) IN URINE: 111 MMOL/G CREAT (ref 23–275)
CREAT UR-MCNC: 33.2 MG/DL (ref 20–370)
POTASSIUM UR-SCNC: 19 MMOL/L
POTASSIUM/CREAT UR-RTO: 57 MMOL/G CREAT
SODIUM UR-SCNC: 32 MMOL/L
SODIUM/CREAT UR-RTO: 96 MMOL/G CREAT

## 2025-06-14 LAB
ANION GAP SERPL CALCULATED.4IONS-SCNC: 10 MMOL/L (CALC) (ref 7–17)
BUN SERPL-MCNC: 43 MG/DL (ref 7–25)
BUN/CREAT SERPL: 19 (CALC) (ref 6–22)
CALCIUM SERPL-MCNC: 9.3 MG/DL (ref 8.6–10.3)
CHLORIDE SERPL-SCNC: 102 MMOL/L (ref 98–110)
CO2 SERPL-SCNC: 23 MMOL/L (ref 20–32)
CREAT SERPL-MCNC: 2.23 MG/DL (ref 0.7–1.22)
EGFRCR SERPLBLD CKD-EPI 2021: 29 ML/MIN/1.73M2
GLUCOSE SERPL-MCNC: 101 MG/DL (ref 65–99)
POTASSIUM SERPL-SCNC: 5.1 MMOL/L (ref 3.5–5.3)
SODIUM SERPL-SCNC: 135 MMOL/L (ref 135–146)

## 2025-06-15 DIAGNOSIS — E87.1 HYPONATREMIA: ICD-10-CM

## 2025-06-15 LAB
ATRIAL RATE: 53 BPM
Q ONSET: 226 MS
QRS COUNT: 7 BEATS
QRS DURATION: 102 MS
QT INTERVAL: 470 MS
QTC CALCULATION(BAZETT): 401 MS
QTC FREDERICIA: 423 MS
R AXIS: -12 DEGREES
T AXIS: 67 DEGREES
T OFFSET: 461 MS
VENTRICULAR RATE: 44 BPM

## 2025-06-23 ENCOUNTER — OFFICE VISIT (OUTPATIENT)
Dept: PRIMARY CARE | Facility: CLINIC | Age: 85
End: 2025-06-23
Payer: MEDICARE

## 2025-06-23 VITALS
HEART RATE: 93 BPM | WEIGHT: 135 LBS | OXYGEN SATURATION: 100 % | SYSTOLIC BLOOD PRESSURE: 130 MMHG | BODY MASS INDEX: 21.14 KG/M2 | TEMPERATURE: 98 F | DIASTOLIC BLOOD PRESSURE: 76 MMHG

## 2025-06-23 DIAGNOSIS — M25.561 RIGHT KNEE PAIN, UNSPECIFIED CHRONICITY: Primary | ICD-10-CM

## 2025-06-23 PROCEDURE — 1159F MED LIST DOCD IN RCRD: CPT | Performed by: INTERNAL MEDICINE

## 2025-06-23 PROCEDURE — 1111F DSCHRG MED/CURRENT MED MERGE: CPT | Performed by: INTERNAL MEDICINE

## 2025-06-23 PROCEDURE — 3078F DIAST BP <80 MM HG: CPT | Performed by: INTERNAL MEDICINE

## 2025-06-23 PROCEDURE — 3075F SYST BP GE 130 - 139MM HG: CPT | Performed by: INTERNAL MEDICINE

## 2025-06-23 PROCEDURE — 99213 OFFICE O/P EST LOW 20 MIN: CPT | Performed by: INTERNAL MEDICINE

## 2025-06-23 RX ORDER — TRAMADOL HYDROCHLORIDE 50 MG/1
50 TABLET, FILM COATED ORAL EVERY 8 HOURS PRN
Qty: 20 TABLET | Refills: 0 | Status: SHIPPED | OUTPATIENT
Start: 2025-06-23

## 2025-06-23 RX ORDER — METHYLPREDNISOLONE 4 MG/1
TABLET ORAL
Qty: 21 TABLET | Refills: 0 | Status: SHIPPED | OUTPATIENT
Start: 2025-06-23 | End: 2025-06-29

## 2025-06-24 PROBLEM — M25.561 RIGHT KNEE PAIN: Status: ACTIVE | Noted: 2025-06-24

## 2025-06-24 NOTE — PROGRESS NOTES
Subjective   Patient ID: Martín Capellan is a 84 y.o. male who presents for Knee Pain.    In for right knee pain . But possible sciatic symtptoms    Knee Pain         Review of Systems   All other systems reviewed and are negative.      Previous history  Medical History[1]  Surgical History[2]  Social History[3]  Family History[4]  Allergies[5]  Current Outpatient Medications   Medication Instructions    aspirin 81 mg chewable tablet 1 tablet, Daily    cholecalciferol, vitamin D3, (VITAMIN D3 ORAL) 1 tablet, Daily RT    dapagliflozin propanediol (FARXIGA) 10 mg, Every 24 hours    ezetimibe (ZETIA) 10 mg, oral, Nightly    losartan (COZAAR) 50 mg, oral, Daily    methylPREDNISolone (Medrol Dospak) 4 mg tablets Take as directed on package.    traMADol (ULTRAM) 50 mg, oral, Every 8 hours PRN       Objective       Physical Exam  Constitutional:       Appearance: Normal appearance.   HENT:      Head: Normocephalic and atraumatic.      Nose: Nose normal.   Cardiovascular:      Rate and Rhythm: Normal rate and regular rhythm.   Abdominal:      General: Abdomen is flat.      Palpations: Abdomen is soft.   Musculoskeletal:         General: Normal range of motion.      Cervical back: Normal range of motion.   Skin:     General: Skin is warm and dry.   Neurological:      Mental Status: He is alert.           Assessment/Plan   Martín Capellan is a 84 y.o. male who presents for the concerns below:    Assessment & Plan  Right knee pain, unspecified chronicity    Orders:    traMADol (Ultram) 50 mg tablet; Take 1 tablet (50 mg) by mouth every 8 hours if needed for severe pain (7 - 10) for up to 20 doses.    methylPREDNISolone (Medrol Dospak) 4 mg tablets; Take as directed on package.    XR lumbar spine 2-3 views; Future    MR lumbar spine wo IV contrast; Future              Discussed with:   Return in :    Portions of this note were generated using digital voice recognition software, and may contain grammatical errors       Stanislav ARMENTA  MD Soham  06/24/25  10:52 AM         [1] History reviewed. No pertinent past medical history.  [2]   Past Surgical History:  Procedure Laterality Date    CORONARY ARTERY BYPASS GRAFT  04/26/2022    [facility]   [3]   Social History  Tobacco Use    Smoking status: Former     Types: Cigarettes    Smokeless tobacco: Never   Substance Use Topics    Alcohol use: Yes     Alcohol/week: 2.0 standard drinks of alcohol     Types: 2 Standard drinks or equivalent per week     Comment: socially    Drug use: Not Currently   [4]   Family History  Problem Relation Name Age of Onset    No Known Problems Mother      No Known Problems Father     [5]   Allergies  Allergen Reactions    Thiazides Unknown

## 2025-06-24 NOTE — ASSESSMENT & PLAN NOTE
Orders:    traMADol (Ultram) 50 mg tablet; Take 1 tablet (50 mg) by mouth every 8 hours if needed for severe pain (7 - 10) for up to 20 doses.    methylPREDNISolone (Medrol Dospak) 4 mg tablets; Take as directed on package.    XR lumbar spine 2-3 views; Future    MR lumbar spine wo IV contrast; Future

## 2025-06-25 ENCOUNTER — HOSPITAL ENCOUNTER (OUTPATIENT)
Dept: VASCULAR MEDICINE | Facility: HOSPITAL | Age: 85
Discharge: HOME | End: 2025-06-25
Payer: MEDICARE

## 2025-06-25 ENCOUNTER — OFFICE VISIT (OUTPATIENT)
Dept: CARDIOLOGY | Facility: HOSPITAL | Age: 85
End: 2025-06-25
Payer: MEDICARE

## 2025-06-25 VITALS
OXYGEN SATURATION: 98 % | SYSTOLIC BLOOD PRESSURE: 160 MMHG | BODY MASS INDEX: 21.19 KG/M2 | HEIGHT: 67 IN | DIASTOLIC BLOOD PRESSURE: 72 MMHG | WEIGHT: 135 LBS | HEART RATE: 55 BPM

## 2025-06-25 DIAGNOSIS — Z95.1 S/P CABG X 5: ICD-10-CM

## 2025-06-25 DIAGNOSIS — I10 PRIMARY HYPERTENSION: ICD-10-CM

## 2025-06-25 DIAGNOSIS — E78.00 PURE HYPERCHOLESTEROLEMIA: ICD-10-CM

## 2025-06-25 DIAGNOSIS — I65.21 ASYMPTOMATIC STENOSIS OF RIGHT CAROTID ARTERY: ICD-10-CM

## 2025-06-25 DIAGNOSIS — I25.10 CORONARY ARTERY DISEASE INVOLVING NATIVE CORONARY ARTERY OF NATIVE HEART WITHOUT ANGINA PECTORIS: Primary | ICD-10-CM

## 2025-06-25 PROCEDURE — 1159F MED LIST DOCD IN RCRD: CPT | Performed by: INTERNAL MEDICINE

## 2025-06-25 PROCEDURE — 1111F DSCHRG MED/CURRENT MED MERGE: CPT | Performed by: INTERNAL MEDICINE

## 2025-06-25 PROCEDURE — 93880 EXTRACRANIAL BILAT STUDY: CPT | Performed by: SURGERY

## 2025-06-25 PROCEDURE — G2211 COMPLEX E/M VISIT ADD ON: HCPCS | Performed by: INTERNAL MEDICINE

## 2025-06-25 PROCEDURE — 1160F RVW MEDS BY RX/DR IN RCRD: CPT | Performed by: INTERNAL MEDICINE

## 2025-06-25 PROCEDURE — 99212 OFFICE O/P EST SF 10 MIN: CPT | Mod: 25

## 2025-06-25 PROCEDURE — 93880 EXTRACRANIAL BILAT STUDY: CPT

## 2025-06-25 PROCEDURE — 99214 OFFICE O/P EST MOD 30 MIN: CPT | Performed by: INTERNAL MEDICINE

## 2025-06-25 PROCEDURE — 3078F DIAST BP <80 MM HG: CPT | Performed by: INTERNAL MEDICINE

## 2025-06-25 PROCEDURE — 3077F SYST BP >= 140 MM HG: CPT | Performed by: INTERNAL MEDICINE

## 2025-06-25 PROCEDURE — 1036F TOBACCO NON-USER: CPT | Performed by: INTERNAL MEDICINE

## 2025-06-25 NOTE — PATIENT INSTRUCTIONS
Monitor your blood pressure at home.  Follow-up with your primary physician regarding your low hematocrit.  Check a BMP.  Follow-up with nephrology as scheduled.  Follow-up in 6 to 8 months.

## 2025-06-25 NOTE — PROGRESS NOTES
Primary Care Physician: Stanislav Rousseau MD  Date of Visit: 06/25/2025 11:00 AM EDT  Location of visit: [facility]     Chief Complaint:   Chief Complaint   Patient presents with    Follow-up        HPI / Summary:   Martín Capellan is a 84 y.o. male who presents for followup.  He has no cardiac complaints.  He is physically active and walks 2 miles every other day without chest pain or shortness of breath.  He was admitted to the hospital earlier this month.  Last month he started taking hydrochlorothiazide.  After working all day he went out and sat in the sun.  After being in the sun for 15 minutes he stood up from the seated position and immediately fell and hit his head.  He is not certain if he lost consciousness.  He went to the hospital.  A CT of the head was unremarkable.  He was found to have low sodium level and admitted to the hospital.  Hydrochlorothiazide was stopped and he was given IV fluids.  He denies any further episodes of lightheadedness.  He increase losartan on his own to 50 mg twice a day from 50 mg daily.  The patient denies chest pain, shortness of breath, palpitations, orthopnea, paroxysmal nocturnal dyspnea, lower extremity edema, or bleeding problems.    He does monitor his blood pressure at home and his systolic blood pressure ranges from the low 100s to 150s.  It usually is in the 130s systolic.    He stopped taking doxazosin and chlorthalidone secondary to fatigue and lower extremity edema and lightheadedness.      History reviewed. No pertinent past medical history.     Past Surgical History:   Procedure Laterality Date    CORONARY ARTERY BYPASS GRAFT  04/26/2022    CABG          Social History:   reports that he has quit smoking. His smoking use included cigarettes. He has never used smokeless tobacco. He reports current alcohol use of about 2.0 standard drinks of alcohol per week. He reports that he does not currently use drugs.     Family History:  family history includes No  "Known Problems in his father and mother.      Allergies:  Allergies   Allergen Reactions    Thiazides Unknown       Outpatient Medications:  Current Outpatient Medications   Medication Instructions    aspirin 81 mg chewable tablet 1 tablet, Daily    cholecalciferol, vitamin D3, (VITAMIN D3 ORAL) 1 tablet, Daily RT    dapagliflozin propanediol (FARXIGA) 10 mg, Every 24 hours    ezetimibe (ZETIA) 10 mg, oral, Nightly    losartan (COZAAR) 50 mg, oral, Daily    methylPREDNISolone (Medrol Dospak) 4 mg tablets Take as directed on package.    traMADol (ULTRAM) 50 mg, oral, Every 8 hours PRN       Physical Exam:  Vitals:    06/25/25 1103   BP: 160/72   Pulse: 55   SpO2: 98%   Weight: 61.2 kg (135 lb)   Height: 1.702 m (5' 7\")     Wt Readings from Last 5 Encounters:   06/25/25 61.2 kg (135 lb)   06/23/25 61.2 kg (135 lb)   06/07/25 64.9 kg (143 lb)   02/17/25 60.3 kg (133 lb)   02/03/25 59.9 kg (132 lb)     Body mass index is 21.14 kg/m².     General: Well-developed well-nourished in no acute distress  HEENT: Normocephalic atraumatic  Neck: Supple, JVP is normal negative hepatojugular reflux 2+ carotid pulses right carotid bruit   Pulmonary: Normal respiratory effort, clear to auscultation  Cardiovascular: No right ventricular heave, normal S1 and S2, no murmurs rubs or gallops  Abdomen: Soft nontender nondistended  Extremities: Warm without edema 2+ radial pulses bilaterally  Neurologic: Alert and oriented x3  Psychiatric: Normal mood and affect     Last Labs:  CMP:  Recent Labs     06/13/25  0804 06/08/25  0755 06/08/25  0518    128* 126*   K 5.1 4.6 3.9    98 98   CO2 23 20* 21   ANIONGAP 10 15 11   BUN 43* 39* 40*   CREATININE 2.23* 2.29* 2.25*   EGFR 29* 28* 28*   GLUCOSE 101* 98 90     Recent Labs     06/07/25  1502 02/28/25  0845 01/03/25  0838 10/11/24  0839 07/11/24  0933 06/26/24  0954 10/08/20  1032 05/21/20  0829   ALBUMIN 4.1 4.5 4.5  --    < >  --    < > 4.4   ALKPHOS 60 79  --   --   --   --   --  " "56   ALT 18 22  --  23  --  23   < > 24   AST 24 23  --   --   --  19  --  23   BILITOT 0.6 0.9  --   --   --   --   --  0.5    < > = values in this interval not displayed.     CBC:  Recent Labs     06/08/25  0518 06/07/25  1502 02/28/25  0845   WBC 6.5 6.8 6.0   HGB 9.3* 9.8* 11.0*   HCT 26.6* 28.6* 33.7*    227 240   MCV 95 96 97.4     COAG: No results for input(s): \"INR\", \"DDIMERVTE\" in the last 39295 hours.  HEME/ENDO:  Recent Labs     01/03/25  0838 10/11/24  0839 05/16/24  0844 08/14/23  0940 03/29/23  0848 11/09/22  0842 03/02/22  1130 05/07/21  0831 02/03/21  0825 10/08/20  1032 07/17/19  0909   FERRITIN 383*  --  423* 393*  --    < >  --   --   --   --   --    IRONSAT 38  --  27 43  --    < >  --   --   --   --   --    TSH  --  0.93  --   --  1.75  --  0.93   < > 0.33*   < > 0.84   HGBA1C  --  5.6  --   --   --   --   --   --  5.8  --  5.5    < > = values in this interval not displayed.      CARDIAC:   Recent Labs     06/07/25  1635 06/07/25  1502   TROPHS 19 17     Recent Labs     10/11/24  0839 06/26/24  0954 03/29/23  0848 01/25/23  1533 03/02/22  1130   CHOL 158 197 162 127 231*   LDLF  --   --  73 64 130*   LDLCALC 85 99  --   --   --    HDL 59.5 75.7 68.6 40.2 76.3   TRIG 67 114 103 114 126       Last Cardiology Tests:  ECG:  Electrocardiogram performed June 7, 2025 that I reviewed shows sinus bradycardia with first-degree AV block left axis deviation prior septal infarct.    Echo:  Echocardiogram March 25, 2017  CONCLUSIONS:   1. The left ventricular systolic function is hyperdynamic with a 65-70% estimated ejection fraction.   2. Spectral Doppler shows an impaired relaxation pattern of left ventricular diastolic filling.   3. The left atrium is upper limits of normal in size.    Cath:  Cardiac catheterization March 24, 2017  Coronary Lesion Summary:  Vessel         Stenosis   Vessel Segment  Left Main    90% stenosis     distal  LAD          80% stenosis     ostial  2nd Diagonal 80% stenosis  "    ostial  Circumflex   80% stenosis     ostial  Circumflex   40% stenosis      mid  Ramus        95% stenosis     ostial  CONCLUSIONS:   1. Severe CAD involving distal LM, ostial LAD, LCX, ramus and diagonal.   2. Elevated LVEDP.   3. No aortic stenosis.    Stress Test:  Stress Results:  No results found for this or any previous visit from the past 365 days.         Cardiac Imaging:  Carotid ultrasound March 23, 2023  Right Carotid: Findings are consistent with greater than 70% stenosis of the right proximal ICA. Turbulent flow seen by color Doppler. There is a >50% stenosis noted in the right external carotid artery. No evidence of hemodynamically significant stenosis of the right common carotid artery. Abnormal pre steal waveforms are visualized in the right vertebral artery suggestive of proximal stenosis. There is a >50% stenosis noted in the right subclavian artery.     Left Carotid: Findings are consistent with 50 to 69% stenosis of the left proximal ICA. Turbulent flow seen by color Doppler. Unable to obtain a doppler signal in the left ECA that is suggestive of occlusion. No evidence of hemodynamically significant stenosis of the left common carotid artery. The left vertebral artery is patent with antegrade flow. No evidence of hemodynamically significant stenosis in the left subclavian artery.      Assessment/Plan   Diagnoses and all orders for this visit:  Coronary artery disease involving native coronary artery of native heart without angina pectoris  Pure hypercholesterolemia  Primary hypertension  -     Basic metabolic panel; Future  S/P CABG x 5      In summary Mr. Capellan is a pleasant 84 year-old male with a past medical history significant for coronary artery disease status post CABG with preserved LV function, hypertension, hyperlipidemia with intolerance to multiple statins and Repatha, moderate bilateral carotid artery disease, peripheral arterial disease with abnormal MEY on the left,  asymptomatic Mobitz one second degree AV block and chronic kidney disease.  He is relatively asymptomatic from a cardiac perspective.  He is euvolemic on exam.  He either had a fall or orthostatic syncope earlier this month in the setting of restarting a diuretic and hyponatremia.  He has been monitoring his blood pressure at home and his systolic readings are better than what we measured today.  He does not desire to take any additional medications at this time.  He will continue to monitor his blood pressure at home.  His last lipid profile was not at goal.  He does not wish to take any other medications for his cholesterol.  I did instruct him to follow-up with his primary physician regarding his low hemoglobin and hematocrit.  He should continue his current cardiovascular medications.  He will follow-up with nephrology as scheduled.  I will see him back in follow-up in 8 months (he declined coming back sooner).      Orders:  Orders Placed This Encounter   Procedures    Basic metabolic panel      Followup Appts:  Future Appointments   Date Time Provider Department Center   8/18/2025  9:00 AM Peter Aldridge MD [id number] [facility]   10/20/2025 10:00 AM Stanislav Rousseau MD [id number] [facility]   4/1/2026  9:40 AM John Jimenez MD [id number] [facility]           ____________________________________________________________  John Jimenez MD  [facility] [place]  [facility]

## 2025-07-31 LAB
ALBUMIN SERPL-MCNC: 4.1 G/DL (ref 3.6–5.1)
ALBUMIN/CREAT UR: 714 MG/G CREAT
ANION GAP SERPL CALCULATED.4IONS-SCNC: 12 MMOL/L (CALC) (ref 7–17)
BUN SERPL-MCNC: 36 MG/DL (ref 7–25)
BUN SERPL-MCNC: 38 MG/DL (ref 7–25)
BUN/CREAT SERPL: 17 (CALC) (ref 6–22)
BUN/CREAT SERPL: 18 (CALC) (ref 6–22)
CALCIUM SERPL-MCNC: 8.8 MG/DL (ref 8.6–10.3)
CALCIUM SERPL-MCNC: 8.9 MG/DL (ref 8.6–10.3)
CHLORIDE SERPL-SCNC: 103 MMOL/L (ref 98–110)
CHLORIDE SERPL-SCNC: 105 MMOL/L (ref 98–110)
CO2 SERPL-SCNC: 19 MMOL/L (ref 20–32)
CO2 SERPL-SCNC: 23 MMOL/L (ref 20–32)
CREAT SERPL-MCNC: 2.11 MG/DL (ref 0.7–1.22)
CREAT SERPL-MCNC: 2.17 MG/DL (ref 0.7–1.22)
CREAT UR-MCNC: 21 MG/DL (ref 20–320)
CREAT UR-MCNC: 21 MG/DL (ref 20–320)
EGFRCR SERPLBLD CKD-EPI 2021: 29 ML/MIN/1.73M2
EGFRCR SERPLBLD CKD-EPI 2021: 30 ML/MIN/1.73M2
GLUCOSE SERPL-MCNC: 92 MG/DL (ref 65–99)
GLUCOSE SERPL-MCNC: 94 MG/DL (ref 65–99)
MICROALBUMIN UR-MCNC: 15 MG/DL
PHOSPHATE SERPL-MCNC: 4 MG/DL (ref 2.1–4.3)
POTASSIUM SERPL-SCNC: 4.9 MMOL/L (ref 3.5–5.3)
POTASSIUM SERPL-SCNC: 5 MMOL/L (ref 3.5–5.3)
PROT UR-MCNC: 27 MG/DL (ref 5–25)
PROT/CREAT UR: 1.29 MG/MG CREAT (ref 0.03–0.15)
PROT/CREAT UR: 1286 MG/G CREAT (ref 25–148)
SODIUM SERPL-SCNC: 135 MMOL/L (ref 135–146)
SODIUM SERPL-SCNC: 136 MMOL/L (ref 135–146)

## 2025-08-18 ENCOUNTER — APPOINTMENT (OUTPATIENT)
Dept: NEPHROLOGY | Facility: CLINIC | Age: 85
End: 2025-08-18
Payer: COMMERCIAL

## 2025-08-18 VITALS
OXYGEN SATURATION: 97 % | WEIGHT: 142.4 LBS | HEART RATE: 67 BPM | HEIGHT: 67 IN | SYSTOLIC BLOOD PRESSURE: 130 MMHG | BODY MASS INDEX: 22.35 KG/M2 | TEMPERATURE: 97.8 F | DIASTOLIC BLOOD PRESSURE: 68 MMHG

## 2025-08-18 DIAGNOSIS — R80.9 PROTEINURIA, UNSPECIFIED TYPE: ICD-10-CM

## 2025-08-18 DIAGNOSIS — N18.4 CHRONIC KIDNEY DISEASE, STAGE 4 (SEVERE) (MULTI): Primary | ICD-10-CM

## 2025-08-18 PROCEDURE — 1159F MED LIST DOCD IN RCRD: CPT | Performed by: INTERNAL MEDICINE

## 2025-08-18 PROCEDURE — 1126F AMNT PAIN NOTED NONE PRSNT: CPT | Performed by: INTERNAL MEDICINE

## 2025-08-18 PROCEDURE — 3078F DIAST BP <80 MM HG: CPT | Performed by: INTERNAL MEDICINE

## 2025-08-18 PROCEDURE — 99214 OFFICE O/P EST MOD 30 MIN: CPT | Performed by: INTERNAL MEDICINE

## 2025-08-18 PROCEDURE — 3075F SYST BP GE 130 - 139MM HG: CPT | Performed by: INTERNAL MEDICINE

## 2025-08-18 PROCEDURE — 1160F RVW MEDS BY RX/DR IN RCRD: CPT | Performed by: INTERNAL MEDICINE

## 2025-08-18 ASSESSMENT — PAIN SCALES - GENERAL: PAINLEVEL_OUTOF10: 0-NO PAIN

## 2025-10-20 ENCOUNTER — APPOINTMENT (OUTPATIENT)
Dept: PRIMARY CARE | Facility: CLINIC | Age: 85
End: 2025-10-20
Payer: COMMERCIAL

## 2026-02-19 ENCOUNTER — APPOINTMENT (OUTPATIENT)
Dept: NEPHROLOGY | Facility: CLINIC | Age: 86
End: 2026-02-19
Payer: MEDICARE